# Patient Record
Sex: FEMALE | Race: WHITE | NOT HISPANIC OR LATINO | Employment: FULL TIME | URBAN - METROPOLITAN AREA
[De-identification: names, ages, dates, MRNs, and addresses within clinical notes are randomized per-mention and may not be internally consistent; named-entity substitution may affect disease eponyms.]

---

## 2022-01-13 ENCOUNTER — TRANSFERRED RECORDS (OUTPATIENT)
Dept: HEALTH INFORMATION MANAGEMENT | Facility: CLINIC | Age: 37
End: 2022-01-13

## 2022-01-13 LAB
ALT SERPL-CCNC: 25 IU/L (ref 7–54)
AST SERPL-CCNC: 16 IU/L (ref 8–48)
CREATININE (EXTERNAL): 0.88 MG/DL (ref 0.6–1.3)
GFR ESTIMATED (EXTERNAL): >60 ML/MIN/1.73M2
GFR ESTIMATED (IF AFRICAN AMERICAN) (EXTERNAL): >60 ML/MIN/1.73M2
GLUCOSE (EXTERNAL): 95 MG/DL (ref 65–99)
POTASSIUM (EXTERNAL): 4.1 MMOL/L (ref 3.3–5)

## 2022-04-15 ENCOUNTER — TRANSFERRED RECORDS (OUTPATIENT)
Dept: HEALTH INFORMATION MANAGEMENT | Facility: CLINIC | Age: 37
End: 2022-04-15

## 2022-06-06 ENCOUNTER — VIRTUAL VISIT (OUTPATIENT)
Dept: URGENT CARE | Facility: CLINIC | Age: 37
End: 2022-06-06
Payer: COMMERCIAL

## 2022-06-06 DIAGNOSIS — I77.74 VERTEBRAL ARTERY DISSECTION (H): ICD-10-CM

## 2022-06-06 DIAGNOSIS — U07.1 COVID-19 VIRUS INFECTION: Primary | ICD-10-CM

## 2022-06-06 PROCEDURE — 99203 OFFICE O/P NEW LOW 30 MIN: CPT | Mod: 95

## 2022-06-06 RX ORDER — ASPIRIN 81 MG/1
81 TABLET, CHEWABLE ORAL DAILY
Qty: 90 TABLET | Refills: 0 | COMMUNITY
Start: 2022-06-06 | End: 2022-07-11

## 2022-06-06 RX ORDER — CLOPIDOGREL BISULFATE 75 MG/1
75 TABLET ORAL DAILY
Qty: 90 TABLET | Refills: 3 | COMMUNITY
Start: 2022-06-06 | End: 2022-07-11

## 2022-06-06 NOTE — PROGRESS NOTES
Macrina is a 37 year old who is being evaluated via a billable phone visit.      Tested + today.  Sx started yesterday 6/5  COVID vaccinated and boosted.    HA, body aches, fever and chills.    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  Residual--LEFT sided hypersensitivity and fatigue.    Just moved from [place].    Assessment & Plan     1. COVID-19 infection    - molnupiravir (LAGEVRIO) 200 MG capsule; Take 4 capsules (800 mg) by mouth every 12 hours for 5 days  Dispense: 40 each; Refill: 0    On Plavix and ASA-- will avoid Paxlovid and use molnupiravir with recent COVID infection.    COVID-19 positive patient.  Encounter for consideration of medication intervention. Patient does qualify for a prescription. Full discussion with patient including medication options, risks and benefits. Potential drug interactions reviewed with patient.     Treatment Planned Paxlovid, Rx sent to [place] pharmacy  [facility]   [phone]    [address] [place] [address]    Hours:  Mon-Fri: 8:30a - 5:00p  Sat-Sun: 9:00a - 1:00p    Drive-thru available   Temporary change to home medications:  None     Iris Cao MD  [facility] Urgent Care  [facility]    Subjective   Macrina is a 37 year old who presents for the following health issues     HPI     Tested + today.  Sx started yesterday 6/5  COVID vaccinated and boosted.    HA, body aches, fever and chills.    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  Residual--LEFT sided hypersensitivity and fatigue.    Just moved from [place].      Review of Systems   Constitutional, HEENT, cardiovascular, pulmonary, GI, , musculoskeletal, neuro, skin, endocrine and psych systems are negative, except as otherwise noted.      Objective           Vitals:  No vitals were obtained today due to virtual visit.    Physical Exam   GENERAL: Healthy, alert and no distress  PSYCH:  mentation appears normal and affect normal/bright    Phone call duration # 12 minutes.

## 2022-07-11 ENCOUNTER — OFFICE VISIT (OUTPATIENT)
Dept: FAMILY MEDICINE | Facility: CLINIC | Age: 37
End: 2022-07-11
Payer: COMMERCIAL

## 2022-07-11 VITALS
HEART RATE: 84 BPM | WEIGHT: 114 LBS | OXYGEN SATURATION: 99 % | HEIGHT: 61 IN | DIASTOLIC BLOOD PRESSURE: 80 MMHG | BODY MASS INDEX: 21.52 KG/M2 | SYSTOLIC BLOOD PRESSURE: 110 MMHG

## 2022-07-11 DIAGNOSIS — R93.2 ABNORMAL CT OF LIVER: ICD-10-CM

## 2022-07-11 DIAGNOSIS — I77.74 VERTEBRAL ARTERY DISSECTION (H): ICD-10-CM

## 2022-07-11 DIAGNOSIS — K50.00 CROHN'S DISEASE OF SMALL INTESTINE WITHOUT COMPLICATION (H): ICD-10-CM

## 2022-07-11 DIAGNOSIS — I63.211 CEREBROVASCULAR ACCIDENT (CVA) DUE TO OCCLUSION OF RIGHT VERTEBRAL ARTERY (H): ICD-10-CM

## 2022-07-11 DIAGNOSIS — Z00.00 ROUTINE GENERAL MEDICAL EXAMINATION AT A HEALTH CARE FACILITY: Primary | ICD-10-CM

## 2022-07-11 DIAGNOSIS — Z12.4 CERVICAL CANCER SCREENING: ICD-10-CM

## 2022-07-11 DIAGNOSIS — R20.2 PARESTHESIAS: ICD-10-CM

## 2022-07-11 DIAGNOSIS — Z13.220 SCREENING FOR LIPID DISORDERS: ICD-10-CM

## 2022-07-11 DIAGNOSIS — Z80.3 FAMILY HISTORY OF MALIGNANT NEOPLASM OF BREAST: ICD-10-CM

## 2022-07-11 DIAGNOSIS — J84.10 LUNG GRANULOMA (H): ICD-10-CM

## 2022-07-11 PROBLEM — E55.9 VITAMIN D DEFICIENCY: Status: ACTIVE | Noted: 2019-04-22

## 2022-07-11 PROBLEM — N87.0 CIN I (CERVICAL INTRAEPITHELIAL NEOPLASIA I): Status: ACTIVE | Noted: 2018-01-16

## 2022-07-11 PROBLEM — R92.8 ABNORMAL MAMMOGRAM: Status: ACTIVE | Noted: 2017-12-27

## 2022-07-11 PROBLEM — B02.9 HERPES ZOSTER WITHOUT COMPLICATION: Status: ACTIVE | Noted: 2019-11-13

## 2022-07-11 PROBLEM — I73.00 RAYNAUD'S DISEASE WITHOUT GANGRENE: Status: ACTIVE | Noted: 2019-04-22

## 2022-07-11 PROBLEM — H53.2 DOUBLE VISION: Status: ACTIVE | Noted: 2021-12-30

## 2022-07-11 PROBLEM — N60.19 FIBROCYSTIC BREAST CHANGES: Status: ACTIVE | Noted: 2017-12-27

## 2022-07-11 PROBLEM — R92.30 DENSE BREAST TISSUE ON MAMMOGRAM: Status: ACTIVE | Noted: 2017-12-27

## 2022-07-11 PROBLEM — R87.612 PAPANICOLAOU SMEAR OF CERVIX WITH LOW GRADE SQUAMOUS INTRAEPITHELIAL LESION (LGSIL): Status: ACTIVE | Noted: 2017-11-13

## 2022-07-11 LAB
ALBUMIN SERPL BCG-MCNC: 4.3 G/DL (ref 3.5–5.2)
ALP SERPL-CCNC: 67 U/L (ref 35–104)
ALT SERPL W P-5'-P-CCNC: 18 U/L (ref 10–35)
ANION GAP SERPL CALCULATED.3IONS-SCNC: 7 MMOL/L (ref 7–15)
AST SERPL W P-5'-P-CCNC: 23 U/L (ref 10–35)
BILIRUB SERPL-MCNC: 0.4 MG/DL
BUN SERPL-MCNC: 11.2 MG/DL (ref 6–20)
CALCIUM SERPL-MCNC: 9.6 MG/DL (ref 8.6–10)
CHLORIDE SERPL-SCNC: 104 MMOL/L (ref 98–107)
CHOLEST SERPL-MCNC: 171 MG/DL
CREAT SERPL-MCNC: 0.74 MG/DL (ref 0.51–0.95)
DEPRECATED HCO3 PLAS-SCNC: 29 MMOL/L (ref 22–29)
ERYTHROCYTE [DISTWIDTH] IN BLOOD BY AUTOMATED COUNT: 12.9 % (ref 10–15)
GFR SERPL CREATININE-BSD FRML MDRD: >90 ML/MIN/1.73M2
GLUCOSE SERPL-MCNC: 87 MG/DL (ref 70–99)
HCT VFR BLD AUTO: 40.7 % (ref 35–47)
HDLC SERPL-MCNC: 66 MG/DL
HGB BLD-MCNC: 13.1 G/DL (ref 11.7–15.7)
LDLC SERPL CALC-MCNC: 93 MG/DL
MCH RBC QN AUTO: 29.6 PG (ref 26.5–33)
MCHC RBC AUTO-ENTMCNC: 32.2 G/DL (ref 31.5–36.5)
MCV RBC AUTO: 92 FL (ref 78–100)
NONHDLC SERPL-MCNC: 105 MG/DL
PLATELET # BLD AUTO: 301 10E3/UL (ref 150–450)
POTASSIUM SERPL-SCNC: 4.1 MMOL/L (ref 3.4–5.3)
PROT SERPL-MCNC: 7 G/DL (ref 6.4–8.3)
RBC # BLD AUTO: 4.43 10E6/UL (ref 3.8–5.2)
SODIUM SERPL-SCNC: 140 MMOL/L (ref 136–145)
TRIGL SERPL-MCNC: 60 MG/DL
WBC # BLD AUTO: 6.5 10E3/UL (ref 4–11)

## 2022-07-11 PROCEDURE — 36415 COLL VENOUS BLD VENIPUNCTURE: CPT | Performed by: FAMILY MEDICINE

## 2022-07-11 PROCEDURE — 99385 PREV VISIT NEW AGE 18-39: CPT | Performed by: FAMILY MEDICINE

## 2022-07-11 PROCEDURE — G0145 SCR C/V CYTO,THINLAYER,RESCR: HCPCS | Performed by: FAMILY MEDICINE

## 2022-07-11 PROCEDURE — 87624 HPV HI-RISK TYP POOLED RSLT: CPT | Performed by: FAMILY MEDICINE

## 2022-07-11 PROCEDURE — 85027 COMPLETE CBC AUTOMATED: CPT | Performed by: FAMILY MEDICINE

## 2022-07-11 PROCEDURE — 99214 OFFICE O/P EST MOD 30 MIN: CPT | Mod: 25 | Performed by: FAMILY MEDICINE

## 2022-07-11 PROCEDURE — 80053 COMPREHEN METABOLIC PANEL: CPT | Performed by: FAMILY MEDICINE

## 2022-07-11 PROCEDURE — 80061 LIPID PANEL: CPT | Performed by: FAMILY MEDICINE

## 2022-07-11 RX ORDER — PRENATAL VIT/IRON FUM/FOLIC AC 27MG-0.8MG
1 TABLET ORAL DAILY
COMMUNITY
End: 2023-02-28

## 2022-07-11 RX ORDER — CLOPIDOGREL BISULFATE 75 MG/1
75 TABLET ORAL DAILY
Qty: 90 TABLET | Refills: 1 | Status: SHIPPED | OUTPATIENT
Start: 2022-07-11 | End: 2023-01-17

## 2022-07-11 ASSESSMENT — ENCOUNTER SYMPTOMS
FEVER: 0
SORE THROAT: 0
FREQUENCY: 0
CONSTIPATION: 0
DYSURIA: 0
COUGH: 0
EYE PAIN: 0
WEAKNESS: 0
MYALGIAS: 0
PALPITATIONS: 0
ARTHRALGIAS: 0
ABDOMINAL PAIN: 0
HEARTBURN: 0
HEMATURIA: 0
NAUSEA: 0
HEADACHES: 0
NERVOUS/ANXIOUS: 0
SHORTNESS OF BREATH: 0
HEMATOCHEZIA: 0
PARESTHESIAS: 1
DIARRHEA: 0
DIZZINESS: 0
BREAST MASS: 0
CHILLS: 0
JOINT SWELLING: 0

## 2022-07-11 NOTE — PROGRESS NOTES
"   SUBJECTIVE:   CC: Macrina Gonzales is an 37 year old woman who presents for preventive health visit.     Patient has been advised of split billing requirements and indicates understanding: Yes     Healthy Habits:     Getting at least 3 servings of Calcium per day:  Yes    Bi-annual eye exam:  Yes    Dental care twice a year:  Yes    Sleep apnea or symptoms of sleep apnea:  None    Diet:  Regular (no restrictions)    Frequency of exercise:  2-3 days/week    Duration of exercise:  15-30 minutes    Taking medications regularly:  Yes    Medication side effects:  Not applicable    PHQ-2 Total Score: 0    Additional concerns today:  Yes    Problems to Add:  1.  Has a history of CVA related to a vertebral artery dissection.  Moved to [place] from [place] earlier this year.  At her last visit on 4/18/22 with stroke neurology in [place]:  Recommendations:   - Continue Plavix 75 mg daily. Recommend 1 year of treatment with single antiplatelet agent.   - No need for further neuroimaging unless she has headache, neck pain, new focal neurological symptoms.   - Discuss CT Chest/Abdomen/Pelvis results with her PCP. I defer need for further consultation (pulmonology, rheumatology?) as well as need for further testing to her primary team. She is moving to [place] in 2 weeks. She will likely need all this done with new team in [place] area.   - Establish care with stroke neurology in [place]. She can consider [facility] or [facility], both have excellent stroke programs. This will ultimately depend on her insurance. I advised her to establish care with PCP and obtain a referral from them.   Fareed Vallejo MD  Vascular Neurology  Needs to establish with a stroke neurologist here in [place].  Has some questions about how long she needs to continue dual antiplatelet therapy.  2.  Has some ongoing left-sided hypersensitivity.  During the day takes \"CBG\", and at night eats part of a THC gummy.  Tingling discomfort, " pins and needles sensation, worse in left leg/calf area.  Wondering what to do since THC is not legal in [place].  3. Off medicine for Crohn's since 2021.  No flares recently.  Diagnosed at age 18, severe disease requiring iliectomy in the past.  Has not had a flare for approximately 10 years.  Needs to establish with gastroenterology in the area.      Today's PHQ-2 Score:   PHQ-2 (  [employer]) 2022   Q1: Little interest or pleasure in doing things 0   Q2: Feeling down, depressed or hopeless 0   PHQ-2 Score 0   Q1: Little interest or pleasure in doing things Not at all   Q2: Feeling down, depressed or hopeless Not at all   PHQ-2 Score 0       Abuse: Current or Past (Physical, Sexual or Emotional) - No  Do you feel safe in your environment? Yes    Have you ever done Advance Care Planning? (For example, a Health Directive, POLST, or a discussion with a medical provider or your loved ones about your wishes): No, advance care planning information given to patient to review.  Patient declined advance care planning discussion at this time.    Social History     Tobacco Use     Smoking status: Former Smoker     Packs/day: 1.00     Years: 7.00     Pack years: 7.00     Types: Cigarettes     Start date: 2003     Quit date: 2010     Years since quittin.5     Smokeless tobacco: Never Used     Tobacco comment: Smoked when I was younger, no longer smoke   Substance Use Topics     Alcohol use: Not Currently     If you drink alcohol do you typically have >3 drinks per day or >7 drinks per week? No    Alcohol Use 2022   Prescreen: >3 drinks/day or >7 drinks/week? Not Applicable   Prescreen: >3 drinks/day or >7 drinks/week? -   No flowsheet data found.    Reviewed orders with patient.  Reviewed health maintenance and updated orders accordingly - Yes    BP Readings from Last 3 Encounters:   22 110/80    Wt Readings from Last 3 Encounters:   22 51.7 kg (114 lb)                  There is no  problem list on file for this patient.    Past Surgical History:   Procedure Laterality Date     COLONOSCOPY  Need one this summer    Crohn s disease     GI SURGERY  When I was 21    Iliectomy     ORTHOPEDIC SURGERY  When I was 18    Plate in right arm       Social History     Tobacco Use     Smoking status: Former Smoker     Packs/day: 1.00     Years: 7.00     Pack years: 7.00     Types: Cigarettes     Start date: 2003     Quit date: 2010     Years since quittin.5     Smokeless tobacco: Never Used     Tobacco comment: Smoked when I was younger, no longer smoke   Substance Use Topics     Alcohol use: Not Currently     Family History   Problem Relation Age of Onset     Anxiety Disorder Mother      Obesity Mother      Diabetes Father      Hypertension Father      Obesity Father      Anxiety Disorder Brother          Current Outpatient Medications   Medication Sig Dispense Refill     aspirin (ASA) 81 MG chewable tablet Take 1 tablet (81 mg) by mouth daily 90 tablet 0     clopidogrel (PLAVIX) 75 MG tablet Take 1 tablet (75 mg) by mouth daily 90 tablet 3     Prenatal Vit-Fe Fumarate-FA (PRENATAL MULTIVITAMIN W/IRON) 27-0.8 MG tablet Take 1 tablet by mouth daily       vitamin C (ASCORBIC ACID) 100 MG tablet Take 100 mg by mouth       No Known Allergies    Breast Cancer Screening:    FHS-7:   Breast CA Risk Assessment (FHS-7) 2022   Did any of your first-degree relatives have breast or ovarian cancer? No   Did any of your relatives have bilateral breast cancer? Unknown   Did any man in your family have breast cancer? No   Did any woman in your family have breast and ovarian cancer? No   Did any woman in your family have breast cancer before age 50 y? Yes   Do you have 2 or more relatives with breast and/or ovarian cancer? Yes   Do you have 2 or more relatives with breast and/or bowel cancer? No       History of abnormal Pap smear: YES - updated in Problem List and Health Maintenance accordingly    "  Reviewed and updated as needed this visit by clinical staff   Tobacco  Allergies  Meds                Reviewed and updated as needed this visit by Provider   Tobacco  Allergies  Meds  Problems  Med Hx  Surg Hx  Fam Hx  Soc   Hx           Review of Systems   Constitutional: Negative for chills and fever.   HENT: Negative for congestion, ear pain, hearing loss and sore throat.    Eyes: Negative for pain and visual disturbance.   Respiratory: Negative for cough and shortness of breath.    Cardiovascular: Negative for chest pain, palpitations and peripheral edema.   Gastrointestinal: Negative for abdominal pain, constipation, diarrhea, heartburn, hematochezia and nausea.   Breasts:  Negative for tenderness, breast mass and discharge.   Genitourinary: Negative for dysuria, frequency, genital sores, hematuria, pelvic pain, urgency, vaginal bleeding and vaginal discharge.   Musculoskeletal: Negative for arthralgias, joint swelling and myalgias.   Skin: Negative for rash.   Neurological: Positive for paresthesias. Negative for dizziness, weakness and headaches.   Psychiatric/Behavioral: Negative for mood changes. The patient is not nervous/anxious.         OBJECTIVE:   /80 (BP Location: Right arm, Patient Position: Sitting, Cuff Size: Adult Regular)   Pulse 84   Ht 1.556 m (5' 1.25\")   Wt 51.7 kg (114 lb)   LMP 06/25/2022 (Exact Date)   SpO2 99%   Breastfeeding No   BMI 21.36 kg/m    Physical Exam  GENERAL: healthy, alert and no distress  EYES: Eyes grossly normal to inspection, PERRL and conjunctivae and sclerae normal  HENT: ear canals and TM's normal, nose and mouth without ulcers or lesions  NECK: no adenopathy, no asymmetry, masses, or scars and thyroid normal to palpation  RESP: lungs clear to auscultation - no rales, rhonchi or wheezes  BREAST: normal without masses, tenderness or nipple discharge and no palpable axillary masses or adenopathy  CV: regular rate and rhythm, normal S1 S2, no S3 " or S4, no murmur, click or rub, no peripheral edema and peripheral pulses strong  ABDOMEN: soft, nontender, no hepatosplenomegaly, no masses and bowel sounds normal   (female): normal female external genitalia, normal urethral meatus, vaginal mucosa pink, moist, well rugated, and normal cervix/adnexa/uterus without masses or discharge  MS: no gross musculoskeletal defects noted, no edema  SKIN: no suspicious lesions or rashes  NEURO: Normal strength and tone, mentation intact and speech normal  PSYCH: mentation appears normal, affect normal/bright    Diagnostic Test Results:  Labs reviewed in Epic  Pending at time of note    ASSESSMENT/PLAN:   1. Routine general medical examination at a health care facility  Routine history and physical, updated in EMR.  Labs updated as below.  Immunizations are up-to-date.  Pap smear updated today.  Plan repeat physical in 1 year.  - Lipid panel reflex to direct LDL Fasting  - Comprehensive metabolic panel (BMP + Alb, Alk Phos, ALT, AST, Total. Bili, TP)  - CBC with platelets    2. Cerebrovascular accident (CVA) due to occlusion of right vertebral artery (H)  3. Vertebral artery dissection (H)  Patient has some residual left-sided paresthesias for which she would like to use medicinal marijuana.  See below for further discussion.  She needs to establish with a stroke neurologist in the area and referral was placed today.  Per her recommendation from her previous stroke neurologist, she only needed single antiplatelet therapy and Plavix was recommended for at least 1 year.  She will stop aspirin and continue Plavix.  - Adult Neurology  Referral; Future  - clopidogrel (PLAVIX) 75 MG tablet; Take 1 tablet (75 mg) by mouth daily  Dispense: 90 tablet; Refill: 1  - Comprehensive metabolic panel (BMP + Alb, Alk Phos, ALT, AST, Total. Bili, TP)  - CBC with platelets    4. Paresthesias  Discussed with patient that the most efficient way to get certified for medical marijuana in  "[place] is probably to use an online resource.  She will look into this.    5. Crohn's disease of small intestine without complication (H)  In remission for about 10 years per patient.  Gastroenterology referral placed to establish care here in [place].  - Adult GI  Referral - Consult Only; Future  - Comprehensive metabolic panel (BMP + Alb, Alk Phos, ALT, AST, Total. Bili, TP)  - CBC with platelets    6. Abnormal CT of liver  Some densities were found in the liver incidentally on screening of her vasculature after her stroke.  Further evaluation with liver ultrasound was recommended.  This was ordered today.  - US Abdomen Limited; Future    7. Family history of malignant neoplasm of breast  Patient has some onset of had genetic testing of unknown significance.  She thinks her mother has had some genetic testing in terms of breast cancer as well.  She will look into this history a bit more and will defer seeing a genetic counselor herself until next year.  We can have this discussion again then.    8. Lung granuloma (H)  Patient has never been tested for any other autoimmune disorders other than Crohn's.  She has had no history of lung issues, no joint pains, no abnormal skin rashes.  We will hold off on further work-up of granulomas for now.    9. Cervical cancer screening  Routine screening Pap smear updated today.  - Pap Screen with HPV - recommended age 30 - 65 years    10. Screening for lipid disorders  Lipid profile updated today.  - Lipid panel reflex to direct LDL Fasting      Patient has been advised of split billing requirements and indicates understanding: Yes    COUNSELING:  Reviewed preventive health counseling, as reflected in patient instructions  Special attention given to:        Regular exercise       Healthy diet/nutrition       Family planning    Estimated body mass index is 21.36 kg/m  as calculated from the following:    Height as of this encounter: 1.556 m (5' 1.25\").    Weight as " of this encounter: 51.7 kg (114 lb).        She reports that she quit smoking about 12 years ago. Her smoking use included cigarettes. She started smoking about 19 years ago. She has a 7.00 pack-year smoking history. She has never used smokeless tobacco.      Counseling Resources:  ATP IV Guidelines  Pooled Cohorts Equation Calculator  Breast Cancer Risk Calculator  BRCA-Related Cancer Risk Assessment: FHS-7 Tool  FRAX Risk Assessment  ICSI Preventive Guidelines  Dietary Guidelines for Americans, 2010  USDA's MyPlate  ASA Prophylaxis  Lung CA Screening    Alea Jerry MD  [facility]

## 2022-07-15 LAB
BKR LAB AP GYN ADEQUACY: NORMAL
BKR LAB AP GYN INTERPRETATION: NORMAL
BKR LAB AP HPV REFLEX: NORMAL
BKR LAB AP LMP: NORMAL
BKR LAB AP PREVIOUS ABNL DX: NORMAL
BKR LAB AP PREVIOUS ABNORMAL: NORMAL
PATH REPORT.COMMENTS IMP SPEC: NORMAL
PATH REPORT.COMMENTS IMP SPEC: NORMAL
PATH REPORT.RELEVANT HX SPEC: NORMAL

## 2022-07-18 LAB
HUMAN PAPILLOMA VIRUS 16 DNA: NEGATIVE
HUMAN PAPILLOMA VIRUS 18 DNA: NEGATIVE
HUMAN PAPILLOMA VIRUS FINAL DIAGNOSIS: NORMAL
HUMAN PAPILLOMA VIRUS OTHER HR: NEGATIVE

## 2022-07-20 NOTE — TELEPHONE ENCOUNTER
Action 7/20/22 MV 1.11pm   Action Taken Called [facility] to obtain fax # for imaging ' no answer so LVM    8/2/22 MV 4.44pm  Fax # for [facility] is [phone]  Tracking # [id number]    8/5/22 Mv 12.39pm  Imaging disks (multiple) received and sent to  for processing         RECORDS RECEIVED FROM: internal   REASON FOR VISIT: CVA   Date of Appt: 11/8/22   NOTES (FOR ALL VISITS) STATUS DETAILS   [facility]   OFFICE NOTE from other specialist Care Everywhere Dr Lj Aranda @ [facility] OR:  1/25/22    Dr Fareed Vallejo @ [facility] [place]:  1/24/22   DISCHARGE SUMMARY from hospital Care Everywhere [facility] OR:  12/24/21-12/30/21   DISCHARGE REPORT from the ER Care Everywhere [facility] OR:  1/13/22 12/24/21   MEDICATION LIST Internal    IMAGING  (FOR ALL VISITS)     MRI (HEAD, NECK, SPINE) Received [facility] OR:  MRI Brain 1/13/22  MRI BRain 12/24/21   CT (HEAD, NECK, SPINE) Received [facility] OR:  CTA Head Neck 4/15/22  CTA Head Neck 1/13/22  CT Head 1/13/22  CTA Head Neck 12/24/21  CT Head 12/24/21

## 2022-07-20 NOTE — TELEPHONE ENCOUNTER
REFERRAL INFORMATION:    Referring Provider:  Dr. Alea Jerry     Referring Clinic:  [facility]     Reason for Visit/Diagnosis: Crohn's of Small Intestine      FUTURE VISIT INFORMATION:    Appointment Date: 10/20/2022    Appointment Time: 11:20 AM      NOTES STATUS DETAILS   OFFICE NOTE from Referring Provider Internal 7/11/2022 Office visit with Dr. Jerry      OFFICE NOTE from Other Specialist Care Everywhere 4/22/19 Office visit with Dr. Brain Narayanan ([facility])    4/23/18 Office visit with Nay Saldana PA-C ([facility])      HOSPITAL DISCHARGE SUMMARY/  ED VISITS N/A    OPERATIVE REPORT N/A    MEDICATION LIST Internal         ENDOSCOPY  N/A    COLONOSCOPY N/A    ERCP N/A    EUS N/A    STOOL TESTING N/A    PERTINENT LABS Care Everywhere    PATHOLOGY REPORTS (RELATED) N/A    IMAGING (CT, MRI, EGD, MRCP, Small Bowel Follow Through/SBT, MR/CT Enterography) N/A

## 2022-07-25 ENCOUNTER — HOSPITAL ENCOUNTER (OUTPATIENT)
Dept: ULTRASOUND IMAGING | Facility: CLINIC | Age: 37
Discharge: HOME | End: 2022-07-25
Attending: FAMILY MEDICINE | Admitting: FAMILY MEDICINE
Payer: COMMERCIAL

## 2022-07-25 DIAGNOSIS — R93.2 ABNORMAL CT OF LIVER: ICD-10-CM

## 2022-07-25 PROCEDURE — 76705 ECHO EXAM OF ABDOMEN: CPT

## 2022-09-20 ASSESSMENT — ENCOUNTER SYMPTOMS
NERVOUS/ANXIOUS: 1
WEAKNESS: 0
SEIZURES: 0
TINGLING: 1
HEADACHES: 1
TREMORS: 0
SPEECH CHANGE: 0
DECREASED CONCENTRATION: 0
PARALYSIS: 0
DISTURBANCES IN COORDINATION: 0
DIZZINESS: 0
LOSS OF CONSCIOUSNESS: 0
PANIC: 0
NUMBNESS: 1
INSOMNIA: 0
DEPRESSION: 1
MEMORY LOSS: 0

## 2022-09-22 ENCOUNTER — LAB (OUTPATIENT)
Dept: LAB | Facility: CLINIC | Age: 37
End: 2022-09-22
Payer: COMMERCIAL

## 2022-09-22 ENCOUNTER — OFFICE VISIT (OUTPATIENT)
Dept: GASTROENTEROLOGY | Facility: CLINIC | Age: 37
End: 2022-09-22
Attending: FAMILY MEDICINE

## 2022-09-22 VITALS
DIASTOLIC BLOOD PRESSURE: 80 MMHG | BODY MASS INDEX: 22.01 KG/M2 | SYSTOLIC BLOOD PRESSURE: 114 MMHG | HEIGHT: 61 IN | WEIGHT: 116.6 LBS | OXYGEN SATURATION: 99 % | HEART RATE: 84 BPM

## 2022-09-22 DIAGNOSIS — K50.00 CROHN'S DISEASE OF SMALL INTESTINE WITHOUT COMPLICATION (H): ICD-10-CM

## 2022-09-22 LAB
ALBUMIN SERPL-MCNC: 4 G/DL (ref 3.5–5)
ALP SERPL-CCNC: 67 U/L (ref 45–120)
ALT SERPL W P-5'-P-CCNC: 14 U/L (ref 0–45)
ANION GAP SERPL CALCULATED.3IONS-SCNC: 9 MMOL/L (ref 5–18)
AST SERPL W P-5'-P-CCNC: 18 U/L (ref 0–40)
BASOPHILS # BLD AUTO: 0 10E3/UL (ref 0–0.2)
BASOPHILS NFR BLD AUTO: 0 %
BILIRUB DIRECT SERPL-MCNC: 0.2 MG/DL
BILIRUB SERPL-MCNC: 0.5 MG/DL (ref 0–1)
BUN SERPL-MCNC: 14 MG/DL (ref 8–22)
C REACTIVE PROTEIN LHE: <0.1 MG/DL (ref 0–?)
CALCIUM SERPL-MCNC: 9.4 MG/DL (ref 8.5–10.5)
CHLORIDE BLD-SCNC: 103 MMOL/L (ref 98–107)
CO2 SERPL-SCNC: 25 MMOL/L (ref 22–31)
CREAT SERPL-MCNC: 0.79 MG/DL (ref 0.6–1.1)
EOSINOPHIL # BLD AUTO: 0 10E3/UL (ref 0–0.7)
EOSINOPHIL NFR BLD AUTO: 0 %
ERYTHROCYTE [DISTWIDTH] IN BLOOD BY AUTOMATED COUNT: 12.8 % (ref 10–15)
ERYTHROCYTE [SEDIMENTATION RATE] IN BLOOD BY WESTERGREN METHOD: 8 MM/HR (ref 0–20)
FERRITIN SERPL-MCNC: 62 NG/ML (ref 6–175)
FOLATE SERPL-MCNC: 14.2 NG/ML (ref 4.6–34.8)
GFR SERPL CREATININE-BSD FRML MDRD: >90 ML/MIN/1.73M2
GLUCOSE BLD-MCNC: 88 MG/DL (ref 70–125)
HCT VFR BLD AUTO: 42.4 % (ref 35–47)
HGB BLD-MCNC: 13.8 G/DL (ref 11.7–15.7)
IMM GRANULOCYTES # BLD: 0 10E3/UL
IMM GRANULOCYTES NFR BLD: 0 %
IRON BINDING CAPACITY (ROCHE): 236 UG/DL (ref 240–430)
IRON SATN MFR SERPL: 31 % (ref 15–46)
IRON SERPL-MCNC: 72 UG/DL (ref 37–145)
LYMPHOCYTES # BLD AUTO: 2.5 10E3/UL (ref 0.8–5.3)
LYMPHOCYTES NFR BLD AUTO: 31 %
MCH RBC QN AUTO: 29.7 PG (ref 26.5–33)
MCHC RBC AUTO-ENTMCNC: 32.5 G/DL (ref 31.5–36.5)
MCV RBC AUTO: 91 FL (ref 78–100)
MONOCYTES # BLD AUTO: 0.5 10E3/UL (ref 0–1.3)
MONOCYTES NFR BLD AUTO: 6 %
NEUTROPHILS # BLD AUTO: 4.9 10E3/UL (ref 1.6–8.3)
NEUTROPHILS NFR BLD AUTO: 63 %
NRBC # BLD AUTO: 0 10E3/UL
NRBC BLD AUTO-RTO: 0 /100
PLATELET # BLD AUTO: 281 10E3/UL (ref 150–450)
POTASSIUM BLD-SCNC: 4.1 MMOL/L (ref 3.5–5)
PROT SERPL-MCNC: 7.6 G/DL (ref 6–8)
RBC # BLD AUTO: 4.64 10E6/UL (ref 3.8–5.2)
SODIUM SERPL-SCNC: 137 MMOL/L (ref 136–145)
TSH SERPL DL<=0.005 MIU/L-ACNC: 0.97 UIU/ML (ref 0.3–5)
VIT B12 SERPL-MCNC: 369 PG/ML (ref 232–1245)
WBC # BLD AUTO: 7.9 10E3/UL (ref 4–11)

## 2022-09-22 PROCEDURE — 83993 ASSAY FOR CALPROTECTIN FECAL: CPT

## 2022-09-22 PROCEDURE — 82310 ASSAY OF CALCIUM: CPT

## 2022-09-22 PROCEDURE — 86803 HEPATITIS C AB TEST: CPT

## 2022-09-22 PROCEDURE — 82248 BILIRUBIN DIRECT: CPT

## 2022-09-22 PROCEDURE — 85652 RBC SED RATE AUTOMATED: CPT

## 2022-09-22 PROCEDURE — 82607 VITAMIN B-12: CPT

## 2022-09-22 PROCEDURE — 84443 ASSAY THYROID STIM HORMONE: CPT

## 2022-09-22 PROCEDURE — 86481 TB AG RESPONSE T-CELL SUSP: CPT

## 2022-09-22 PROCEDURE — 86704 HEP B CORE ANTIBODY TOTAL: CPT

## 2022-09-22 PROCEDURE — 87340 HEPATITIS B SURFACE AG IA: CPT

## 2022-09-22 PROCEDURE — 82746 ASSAY OF FOLIC ACID SERUM: CPT

## 2022-09-22 PROCEDURE — 82784 ASSAY IGA/IGD/IGG/IGM EACH: CPT

## 2022-09-22 PROCEDURE — 99205 OFFICE O/P NEW HI 60 MIN: CPT | Mod: GC | Performed by: INTERNAL MEDICINE

## 2022-09-22 PROCEDURE — 86706 HEP B SURFACE ANTIBODY: CPT

## 2022-09-22 PROCEDURE — 36415 COLL VENOUS BLD VENIPUNCTURE: CPT

## 2022-09-22 PROCEDURE — 85014 HEMATOCRIT: CPT

## 2022-09-22 PROCEDURE — 86364 TISS TRNSGLTMNASE EA IG CLAS: CPT

## 2022-09-22 PROCEDURE — 86140 C-REACTIVE PROTEIN: CPT

## 2022-09-22 PROCEDURE — 82657 ENZYME CELL ACTIVITY: CPT

## 2022-09-22 PROCEDURE — 83550 IRON BINDING TEST: CPT

## 2022-09-22 PROCEDURE — 82728 ASSAY OF FERRITIN: CPT

## 2022-09-22 ASSESSMENT — ENCOUNTER SYMPTOMS
TACHYCARDIA: 0
SMELL DISTURBANCE: 0
BOWEL INCONTINENCE: 0
EYE WATERING: 0
NECK PAIN: 0
CLAUDICATION: 0
TREMORS: 0
DEPRESSION: 1
DECREASED CONCENTRATION: 0
COUGH: 0
PARALYSIS: 0
SYNCOPE: 0
HOARSE VOICE: 0
HEADACHES: 1
BREAST MASS: 0
SPEECH CHANGE: 0
NUMBNESS: 1
TROUBLE SWALLOWING: 0
BRUISES/BLEEDS EASILY: 0
HEMOPTYSIS: 0
ORTHOPNEA: 0
TINGLING: 1
PANIC: 0
FEVER: 0
EYE IRRITATION: 0
NAUSEA: 0
HYPERTENSION: 0
MUSCLE WEAKNESS: 0
ALTERED TEMPERATURE REGULATION: 0
STIFFNESS: 0
DIZZINESS: 0
SNORES LOUDLY: 0
POLYPHAGIA: 0
NIGHT SWEATS: 0
RECTAL PAIN: 0
SINUS PAIN: 0
VOMITING: 0
HEARTBURN: 0
WEIGHT GAIN: 0
LOSS OF CONSCIOUSNESS: 0
FLANK PAIN: 0
PALPITATIONS: 0
HEMATURIA: 0
ABDOMINAL PAIN: 0
POOR WOUND HEALING: 0
POLYDIPSIA: 0
HALLUCINATIONS: 0
NAIL CHANGES: 0
BLOOD IN STOOL: 0
DISTURBANCES IN COORDINATION: 0
SWOLLEN GLANDS: 0
HYPOTENSION: 0
INSOMNIA: 0
DYSURIA: 0
DOUBLE VISION: 0
DECREASED LIBIDO: 0
TASTE DISTURBANCE: 0
SKIN CHANGES: 0
RECTAL BLEEDING: 0
EYE REDNESS: 0
ARTHRALGIAS: 0
JAUNDICE: 0
EYE PAIN: 0
WEAKNESS: 0
POSTURAL DYSPNEA: 0
CONSTIPATION: 0
COUGH DISTURBING SLEEP: 0
DIARRHEA: 0
RESPIRATORY PAIN: 0
HOT FLASHES: 0
FATIGUE: 0
NERVOUS/ANXIOUS: 1
SPUTUM PRODUCTION: 0
LEG PAIN: 0
NECK MASS: 0
EXERCISE INTOLERANCE: 0
MYALGIAS: 0
WEIGHT LOSS: 0
BLOATING: 0
MUSCLE CRAMPS: 0
MEMORY LOSS: 0
DECREASED APPETITE: 0
CHILLS: 0
LEG SWELLING: 0
DIFFICULTY URINATING: 0
SINUS CONGESTION: 0
SLEEP DISTURBANCES DUE TO BREATHING: 0
JOINT SWELLING: 0
BACK PAIN: 0
LIGHT-HEADEDNESS: 0
WHEEZING: 0
INCREASED ENERGY: 0
BREAST PAIN: 0
SEIZURES: 0
SHORTNESS OF BREATH: 0
DYSPNEA ON EXERTION: 0
SORE THROAT: 0

## 2022-09-22 ASSESSMENT — PAIN SCALES - GENERAL: PAINLEVEL: NO PAIN (0)

## 2022-09-22 NOTE — PROGRESS NOTES
Gastroenterology Clinic        HPI:       Macrina Gonzales is a 37 year old female who presents to hospitals care after moving from [place]    She has a history of Crohn's disease, diagnosed in 2004 at [place] after having severe abdominal pain. She was on 6-MP, prednisone, and entocort. A few years later she had a flare and had a small bowel resection done. After another flare, she was switched to remicade which she stayed on for ~10 years. During that time her Crohn's was mostly under control. Due to insurance issues and difficulty receiving prior authorization every time she needed remicade, she switched to Humira in 8/2016, and her Crohn's remained well controlled. She suffered a CVA in 12/2021 and afterwards she stopped taking her Humira. This was a discussion already in place with her GI doctor in [place] to discontinue the Humira given her remission and see how she would do, with plan to do colonoscopy in the summer of 2022, however she moved to [place]    She says her typical BM is 2-3 stools per day, 4 on the [place] stool chart. Says recently she has been having slightly more stools, maybe 3-4 per day, tends to be 2-4 on [place] stool chart. Also endorses occasional tenesmus. Says she has had occassional blood in the stool, red, small amount, has happened twice since coming off Humira. She denies any extra-intestinal symptoms.     Her last colonoscopy on record was in 8/2017, confirmed disease remission with negative biopsy results. She says she had another colonoscopy in 2019 at [place] that also showed disease remission, although no records of this currently      Problem, Medication and Allergy Lists      Patient Active Problem List    Diagnosis Date Noted     Abnormal CT of liver 07/11/2022     Priority: Medium     Vertebral artery dissection (H) 07/11/2022     Priority: Medium     Family history of malignant neoplasm of breast 07/11/2022     Priority: Medium     Lung granuloma (H) 07/11/2022      Priority: Medium     Paresthesias 07/11/2022     Priority: Medium     left side, primarily leg       Double vision 12/30/2021     Priority: Medium     Cerebrovascular accident (CVA) due to occlusion of right vertebral artery (H) 12/24/2021     Priority: Medium     Formatting of this note is different from the original.  12/28/2021 discharge -clopidogrel 75mg daily. ASA switched to 81mg QD on discharge. Per neuro will need to continue DAPT for 3-6 months. Will need stroke clinic follow-up in ~6 weeks and repeat imaging in three months.    Neurology 1/22 -Recommendations:  1) Further neurodiagnostic work up:   - Repeat CTA head and neck in April 2022.   - Obtain CTA Chest, abdomen and pelvis to evaluate for FMD related changes.      2) Anti platelet therapy or Anti coagulation:   - Continue dual antiplatelet therapy for now- Aspirin 81 + Plavix 75 mg daily.   - Based on the results of follow up CTA, Plavix can be discontinued. Note that she is not breastfeeding since her stroke.     Last Assessment & Plan:   Formatting of this note might be different from the original.  Discussed her ongoing improvement from her stroke in December. Physical improvement is more rapid than cognitive improvement. My recommendation for her is to stay off completely off of work until April 1. She can then return with limitations of 20 hours/week for 4 weeks. As of May 1 you can return to full work. This may need to be modified in the future depending on her recovery.       Herpes zoster without complication 11/13/2019     Priority: Medium     Last Assessment & Plan:   Formatting of this note might be different from the original.  Discussed diagnosis of shingles, more likely with her being on Humira.  She will contact her GI doctor to let him know if her diagnosis.  She is not due for another Humira injection for 2 weeks  Patient is started on valacyclovir 1 g 3 times daily x7 days, discussed symptomatic treatment calling for worsening  "pain, contagiousness       Raynaud's disease without gangrene 04/22/2019     Priority: Medium     Last Assessment & Plan:   Formatting of this note might be different from the original.  Reviewed treatment options, pharmaceutical and nonpharmaceutical.  We will proceed with trial of amlodipine at 2.5 mg daily discussed precautions about hypotension given her baseline blood pressure.  She will let us know how this is doing over the next 3-4 weeks       Vitamin D deficiency 04/22/2019     Priority: Medium     Last Assessment & Plan:   Formatting of this note might be different from the original.  Prescribe 50,000 units weekly, needs blood test may need to change to monthly       CAMELIA I (cervical intraepithelial neoplasia I) 01/16/2018     Priority: Medium     10/30/17 LSIL Pap  1/8/18 Colposcopy: Bx CIN1 ECC: \"minute fragments of mildly atypical endocervical epithelium...most likely representative of reactive/degenerative change\"Plan: Cotesting 1 year  06/20/22 NIL Pap, Neg HR HPV   07/11/22 NIL Pap, Neg HR HPV Plan cotest in 3 years.        Abnormal mammogram 12/27/2017     Priority: Medium     Dense breast tissue on mammogram 12/27/2017     Priority: Medium     Fibrocystic breast changes 12/27/2017     Priority: Medium     Papanicolaou smear of cervix with low grade squamous intraepithelial lesion (LGSIL) 11/13/2017     Priority: Medium     10/30/17, HPV other high risk pos, 16/18neg. Referred for colpo w/ S. CHRISTIAN Guzman.       Crohn's disease of small intestine (H) 11/11/2001     Priority: Medium         Current Outpatient Medications   Medication Sig Dispense Refill     clopidogrel (PLAVIX) 75 MG tablet Take 1 tablet (75 mg) by mouth daily 90 tablet 1     Omega-3 Fatty Acids (FISH OIL PO)        vitamin C (ASCORBIC ACID) 100 MG tablet Take 100 mg by mouth       VITAMIN D, CHOLECALCIFEROL, PO        Prenatal Vit-Fe Fumarate-FA (PRENATAL MULTIVITAMIN W/IRON) 27-0.8 MG tablet Take 1 tablet by mouth daily (Patient not " taking: Reported on 9/22/2022)         No Known Allergies            Review of Systems:     Patient submitted ROS below  Review of Systems     Constitutional:  Negative for fever, chills, weight loss, weight gain, fatigue, decreased appetite, night sweats, recent stressors, height gain, height loss, post-operative complications, incisional pain, hallucinations, increased energy, hyperactivity and confused.   HENT:  Negative for ear pain, hearing loss, tinnitus, nosebleeds, trouble swallowing, hoarse voice, mouth sores, sore throat, ear discharge, tooth pain, gum tenderness, taste disturbance, smell disturbance, hearing aid, bleeding gums, dry mouth, sinus pain, sinus congestion and neck mass.    Eyes:  Negative for double vision, pain, redness, eye pain, decreased vision, eye watering, eye bulging, eye dryness, flashing lights, spots, floaters, strabismus, tunnel vision, jaundice and eye irritation.   Respiratory:   Negative for cough, hemoptysis, sputum production, shortness of breath, wheezing, sleep disturbances due to breathing, snores loudly, respiratory pain, dyspnea on exertion, cough disturbing sleep and postural dyspnea.    Cardiovascular:  Negative for chest pain, dyspnea on exertion, palpitations, orthopnea, claudication, leg swelling, fingers/toes turn blue, hypertension, hypotension, syncope, history of heart murmur, chest pain on exertion, chest pain at rest, pacemaker, few scattered varicosities, leg pain, sleep disturbances due to breathing, tachycardia, light-headedness, exercise intolerance and edema.   Gastrointestinal:  Negative for heartburn, nausea, vomiting, abdominal pain, diarrhea, constipation, blood in stool, melena, rectal pain, bloating, hemorrhoids, bowel incontinence, jaundice, rectal bleeding, coffee ground emesis and change in stool.   Genitourinary:  Negative for bladder incontinence, dysuria, urgency, hematuria, flank pain, vaginal discharge, difficulty urinating, genital sores,  "dyspareunia, decreased libido, nocturia, voiding less frequently, arousal difficulty, abnormal vaginal bleeding, excessive menstruation, menstrual changes, hot flashes, vaginal dryness and postmenopausal bleeding.   Musculoskeletal:  Negative for myalgias, back pain, joint swelling, arthralgias, stiffness, muscle cramps, neck pain, bone pain, muscle weakness and fracture.   Skin:  Negative for nail changes, itching, poor wound healing, rash, hair changes, skin changes, acne, warts, poor wound healing, scarring, flaky skin, Raynaud's phenomenon, sensitivity to sunlight and skin thickening.   Neurological:  Positive for tingling, numbness and headaches. Negative for dizziness, tremors, speech change, seizures, loss of consciousness, weakness, light-headedness, disturbances in coordination, memory loss, difficulty walking and paralysis.   Endo/Heme:  Negative for anemia, swollen glands and bruises/bleeds easily.   Psychiatric/Behavioral:  Positive for depression and mood swings. Negative for hallucinations, memory loss, decreased concentration and panic attacks.    Breast:  Negative for breast discharge, breast mass, breast pain and nipple retraction.   Endocrine:  Negative for altered temperature regulation, polyphagia, polydipsia, unwanted hair growth and change in facial hair.    I have personally reviewed and updated the complete ROS on the day of the visit.           Physical Exam:   /80   Pulse 84   Ht 1.556 m (5' 1.25\")   Wt 52.9 kg (116 lb 9.6 oz)   SpO2 99%   BMI 21.85 kg/m    Body mass index is 21.85 kg/m .  Vitals were reviewed       Physical Exam  Constitutional:       General: She is not in acute distress.     Appearance: She is normal weight.   HENT:      Head: Normocephalic and atraumatic.   Eyes:      General: No scleral icterus.  Cardiovascular:      Rate and Rhythm: Normal rate and regular rhythm.      Pulses: Normal pulses.      Heart sounds: Normal heart sounds. No murmur heard.    No " gallop.   Pulmonary:      Effort: Pulmonary effort is normal. No respiratory distress.      Breath sounds: Normal breath sounds. No wheezing or rales.   Abdominal:      General: Abdomen is flat. Bowel sounds are normal. There is no distension.      Palpations: Abdomen is soft.      Tenderness: There is no abdominal tenderness. There is no guarding.   Musculoskeletal:         General: No edema.      Right lower leg: No edema.      Left lower leg: No edema.   Skin:     Capillary Refill: Capillary refill takes less than 2 seconds.      Comments: No rashes noted along b/l LE   Neurological:      Mental Status: She is alert.           Assessment and Plan     #Crohn's disease  Diagnosed in 2004 at [facility], s/p small bowel resection in 2005. Was on 6-MP, prednisone, entocort, switched to remicade after a flare. Stayed on remicade for 10 years, switched to Humira in 2016 due to insurance reasons. Was in remission on Humira wihout symptoms. Was in discussion with her GI doctor to come off Humira, then had a CVA in 12/2021 and has been off Humira since. Endorses some mild increase in stool (from 2-3 BM/day to 3-4 BM/day), tends to be 2-4 on [place] stool chart, occassional tenesmus, two episodes of bloody stool since 12/2021. Last colonoscopy 2017 with disease remission. Patient would benefit from further workup to evaluate inflammatory burden to determine if needs to be back on medication for Crohn's  - Plan for colonoscopy and MRE  - Fecal calprotectin, ESR, CRP  - Will check CBC, CMP, TSH  - Nutritional markers with Vit D, B9, B12, iron panel. Will also check tTg IgA and IgG  - Will check TB quantiferon gold, Hep B and Hep C if patient ends up needing to go back on medication  - Will try to reach out with her GI doctor in [place], Dr. Sukhwinder Teresa at [facility], to obtain further records  - RTC in 6 months    Options for treatment and follow-up care were reviewed with the patient. Macrina Gonzales engaged  in the decision making process and verbalized understanding of the options discussed and agreed with the final plan.      Pt was seen and plan of care discussed with Dr. Israel Gould, PGY1  Sep 22, 2022

## 2022-09-22 NOTE — PATIENT INSTRUCTIONS
It is good to meet you today. I am so glad you are doing well.    Check labs today    Check stool study (if not able to submit today and take sample to [facility])    My office will contact you about scheduling the colonoscopy    Call to schedule MRI    Start citrucel 1 tsp daily in a glass of water. Slowly increase up to 3 tsp (1 tablespoon) daily    We will work to get your records    Follow up in 6 months With Israel

## 2022-09-22 NOTE — NURSING NOTE
"Chief Complaint   Patient presents with     New Patient       Vitals:    09/22/22 0934   BP: 114/80   Pulse: 84   SpO2: 99%   Weight: 52.9 kg (116 lb 9.6 oz)   Height: 1.556 m (5' 1.25\")       Body mass index is 21.85 kg/m .    Alysia Paulson MA    "

## 2022-09-22 NOTE — LETTER
9/22/2022         RE: Macrina Gonzales  [address] [facility] [address]        Dear Colleague,    Thank you for referring your patient, Macrina Gonzales, to the [facility] GASTROENTEROLOGY CLINIC [place]. Please see a copy of my visit note below.      Gastroenterology Clinic        HPI:       Macrina Gonzales is a 37 year old female who presents to [facility] care after moving from [place]    She has a history of Crohn's disease, diagnosed in 2004 at [facility] after having severe abdominal pain. She was on 6-MP, prednisone, and entocort. A few years later she had a flare and had a small bowel resection done. After another flare, she was switched to remicade which she stayed on for ~10 years. During that time her Crohn's was mostly under control. Due to insurance issues and difficulty receiving prior authorization every time she needed remicade, she switched to Humira in 8/2016, and her Crohn's remained well controlled. She suffered a CVA in 12/2021 and afterwards she stopped taking her Humira. This was a discussion already in place with her GI doctor in [place] to discontinue the Humira given her remission and see how she would do, with plan to do colonoscopy in the summer of 2022, however she moved to [place]    She says her typical BM is 2-3 stools per day, 4 on the [facility] stool chart. Says recently she has been having slightly more stools, maybe 3-4 per day, tends to be 2-4 on [facility] stool chart. Also endorses occasional tenesmus. Says she has had occassional blood in the stool, red, small amount, has happened twice since coming off Humira. She denies any extra-intestinal symptoms.     Her last colonoscopy on record was in 8/2017, confirmed disease remission with negative biopsy results. She says she had another colonoscopy in 2019 at [place] that also showed disease remission, although no records of this currently      Problem, Medication and Allergy Lists      Patient Active Problem  List    Diagnosis Date Noted     Abnormal CT of liver 07/11/2022     Priority: Medium     Vertebral artery dissection (H) 07/11/2022     Priority: Medium     Family history of malignant neoplasm of breast 07/11/2022     Priority: Medium     Lung granuloma (H) 07/11/2022     Priority: Medium     Paresthesias 07/11/2022     Priority: Medium     left side, primarily leg       Double vision 12/30/2021     Priority: Medium     Cerebrovascular accident (CVA) due to occlusion of right vertebral artery (H) 12/24/2021     Priority: Medium     Formatting of this note is different from the original.  12/28/2021 discharge -clopidogrel 75mg daily. ASA switched to 81mg QD on discharge. Per neuro will need to continue DAPT for 3-6 months. Will need stroke clinic follow-up in ~6 weeks and repeat imaging in three months.    Neurology 1/22 -Recommendations:  1) Further neurodiagnostic work up:   - Repeat CTA head and neck in April 2022.   - Obtain CTA Chest, abdomen and pelvis to evaluate for FMD related changes.      2) Anti platelet therapy or Anti coagulation:   - Continue dual antiplatelet therapy for now- Aspirin 81 + Plavix 75 mg daily.   - Based on the results of follow up CTA, Plavix can be discontinued. Note that she is not breastfeeding since her stroke.     Last Assessment & Plan:   Formatting of this note might be different from the original.  Discussed her ongoing improvement from her stroke in December. Physical improvement is more rapid than cognitive improvement. My recommendation for her is to stay off completely off of work until April 1. She can then return with limitations of 20 hours/week for 4 weeks. As of May 1 you can return to full work. This may need to be modified in the future depending on her recovery.       Herpes zoster without complication 11/13/2019     Priority: Medium     Last Assessment & Plan:   Formatting of this note might be different from the original.  Discussed diagnosis of shingles, more  "likely with her being on Humira.  She will contact her GI doctor to let him know if her diagnosis.  She is not due for another Humira injection for 2 weeks  Patient is started on valacyclovir 1 g 3 times daily x7 days, discussed symptomatic treatment calling for worsening pain, contagiousness       Raynaud's disease without gangrene 04/22/2019     Priority: Medium     Last Assessment & Plan:   Formatting of this note might be different from the original.  Reviewed treatment options, pharmaceutical and nonpharmaceutical.  We will proceed with trial of amlodipine at 2.5 mg daily discussed precautions about hypotension given her baseline blood pressure.  She will let us know how this is doing over the next 3-4 weeks       Vitamin D deficiency 04/22/2019     Priority: Medium     Last Assessment & Plan:   Formatting of this note might be different from the original.  Prescribe 50,000 units weekly, needs blood test may need to change to monthly       CAMELIA I (cervical intraepithelial neoplasia I) 01/16/2018     Priority: Medium     10/30/17 LSIL Pap  1/8/18 Colposcopy: Bx CIN1 ECC: \"minute fragments of mildly atypical endocervical epithelium...most likely representative of reactive/degenerative change\"Plan: Cotesting 1 year  06/20/22 NIL Pap, Neg HR HPV   07/11/22 NIL Pap, Neg HR HPV Plan cotest in 3 years.        Abnormal mammogram 12/27/2017     Priority: Medium     Dense breast tissue on mammogram 12/27/2017     Priority: Medium     Fibrocystic breast changes 12/27/2017     Priority: Medium     Papanicolaou smear of cervix with low grade squamous intraepithelial lesion (LGSIL) 11/13/2017     Priority: Medium     10/30/17, HPV other high risk pos, 16/18neg. Referred for colpo w/ S. CHRISTIAN Guzman.       Crohn's disease of small intestine (H) 11/11/2001     Priority: Medium         Current Outpatient Medications   Medication Sig Dispense Refill     clopidogrel (PLAVIX) 75 MG tablet Take 1 tablet (75 mg) by mouth daily 90 " tablet 1     Omega-3 Fatty Acids (FISH OIL PO)        vitamin C (ASCORBIC ACID) 100 MG tablet Take 100 mg by mouth       VITAMIN D, CHOLECALCIFEROL, PO        Prenatal Vit-Fe Fumarate-FA (PRENATAL MULTIVITAMIN W/IRON) 27-0.8 MG tablet Take 1 tablet by mouth daily (Patient not taking: Reported on 9/22/2022)         No Known Allergies            Review of Systems:     Patient submitted ROS below  Review of Systems     Constitutional:  Negative for fever, chills, weight loss, weight gain, fatigue, decreased appetite, night sweats, recent stressors, height gain, height loss, post-operative complications, incisional pain, hallucinations, increased energy, hyperactivity and confused.   HENT:  Negative for ear pain, hearing loss, tinnitus, nosebleeds, trouble swallowing, hoarse voice, mouth sores, sore throat, ear discharge, tooth pain, gum tenderness, taste disturbance, smell disturbance, hearing aid, bleeding gums, dry mouth, sinus pain, sinus congestion and neck mass.    Eyes:  Negative for double vision, pain, redness, eye pain, decreased vision, eye watering, eye bulging, eye dryness, flashing lights, spots, floaters, strabismus, tunnel vision, jaundice and eye irritation.   Respiratory:   Negative for cough, hemoptysis, sputum production, shortness of breath, wheezing, sleep disturbances due to breathing, snores loudly, respiratory pain, dyspnea on exertion, cough disturbing sleep and postural dyspnea.    Cardiovascular:  Negative for chest pain, dyspnea on exertion, palpitations, orthopnea, claudication, leg swelling, fingers/toes turn blue, hypertension, hypotension, syncope, history of heart murmur, chest pain on exertion, chest pain at rest, pacemaker, few scattered varicosities, leg pain, sleep disturbances due to breathing, tachycardia, light-headedness, exercise intolerance and edema.   Gastrointestinal:  Negative for heartburn, nausea, vomiting, abdominal pain, diarrhea, constipation, blood in stool, melena,  "rectal pain, bloating, hemorrhoids, bowel incontinence, jaundice, rectal bleeding, coffee ground emesis and change in stool.   Genitourinary:  Negative for bladder incontinence, dysuria, urgency, hematuria, flank pain, vaginal discharge, difficulty urinating, genital sores, dyspareunia, decreased libido, nocturia, voiding less frequently, arousal difficulty, abnormal vaginal bleeding, excessive menstruation, menstrual changes, hot flashes, vaginal dryness and postmenopausal bleeding.   Musculoskeletal:  Negative for myalgias, back pain, joint swelling, arthralgias, stiffness, muscle cramps, neck pain, bone pain, muscle weakness and fracture.   Skin:  Negative for nail changes, itching, poor wound healing, rash, hair changes, skin changes, acne, warts, poor wound healing, scarring, flaky skin, Raynaud's phenomenon, sensitivity to sunlight and skin thickening.   Neurological:  Positive for tingling, numbness and headaches. Negative for dizziness, tremors, speech change, seizures, loss of consciousness, weakness, light-headedness, disturbances in coordination, memory loss, difficulty walking and paralysis.   Endo/Heme:  Negative for anemia, swollen glands and bruises/bleeds easily.   Psychiatric/Behavioral:  Positive for depression and mood swings. Negative for hallucinations, memory loss, decreased concentration and panic attacks.    Breast:  Negative for breast discharge, breast mass, breast pain and nipple retraction.   Endocrine:  Negative for altered temperature regulation, polyphagia, polydipsia, unwanted hair growth and change in facial hair.    I have personally reviewed and updated the complete ROS on the day of the visit.           Physical Exam:   /80   Pulse 84   Ht 1.556 m (5' 1.25\")   Wt 52.9 kg (116 lb 9.6 oz)   SpO2 99%   BMI 21.85 kg/m    Body mass index is 21.85 kg/m .  Vitals were reviewed       Physical Exam  Constitutional:       General: She is not in acute distress.     Appearance: She " is normal weight.   HENT:      Head: Normocephalic and atraumatic.   Eyes:      General: No scleral icterus.  Cardiovascular:      Rate and Rhythm: Normal rate and regular rhythm.      Pulses: Normal pulses.      Heart sounds: Normal heart sounds. No murmur heard.    No gallop.   Pulmonary:      Effort: Pulmonary effort is normal. No respiratory distress.      Breath sounds: Normal breath sounds. No wheezing or rales.   Abdominal:      General: Abdomen is flat. Bowel sounds are normal. There is no distension.      Palpations: Abdomen is soft.      Tenderness: There is no abdominal tenderness. There is no guarding.   Musculoskeletal:         General: No edema.      Right lower leg: No edema.      Left lower leg: No edema.   Skin:     Capillary Refill: Capillary refill takes less than 2 seconds.      Comments: No rashes noted along b/l LE   Neurological:      Mental Status: She is alert.           Assessment and Plan     #Crohn's disease  Diagnosed in 2004 at [facility], s/p small bowel resection in 2005. Was on 6-MP, prednisone, entocort, switched to remicade after a flare. Stayed on remicade for 10 years, switched to Humira in 2016 due to insurance reasons. Was in remission on Humira wihout symptoms. Was in discussion with her GI doctor to come off Humira, then had a CVA in 12/2021 and has been off Humira since. Endorses some mild increase in stool (from 2-3 BM/day to 3-4 BM/day), tends to be 2-4 on [facility] stool chart, occassional tenesmus, two episodes of bloody stool since 12/2021. Last colonoscopy 2017 with disease remission. Patient would benefit from further workup to evaluate inflammatory burden to determine if needs to be back on medication for Crohn's  - Plan for colonoscopy and MRE  - Fecal calprotectin, ESR, CRP  - Will check CBC, CMP, TSH  - Nutritional markers with Vit D, B9, B12, iron panel. Will also check tTg IgA and IgG  - Will check TB quantiferon gold, Hep B and Hep C if patient ends up needing to  go back on medication  - Will try to reach out with her GI doctor in [place], Dr. Sukhwinder Teresa [facility], to obtain further records  - RTC in 6 months    Options for treatment and follow-up care were reviewed with the patient. Macrina RHODES Avon engaged in the decision making process and verbalized understanding of the options discussed and agreed with the final plan.      Pt was seen and plan of care discussed with Dr. Israel Gould, PGY1  Sep 22, 2022      Attestation signed by Gumaro Wood MD at 10/6/2022 11:17 AM:  ATTENDING ATTESTATION:     DATE SEEN: 9/22/22    Patient was discussed, seen, and examined by me, Gumaro Wood. The plan of care and pertinent data/imaging were reviewed with Dr. Gould. I agree with the assessment and plan as delineated above with the following additions:     She is doing very well from a CD perspective off all therapy (stooped after CVA). We will plan to restage with labs, fecal calprotectin, colonoscopy and MRE. If she continues to be in remission off therapy we can continue to cautiously monitor. If she has evidence of active disease then we will need to discuss restarting adalimumab or alternative biologic therapy.    Please contact me with any further questions.          Again, thank you for allowing me to participate in the care of your patient.      Sincerely,    Gumaro Wood MD

## 2022-09-23 ENCOUNTER — TELEPHONE (OUTPATIENT)
Dept: GASTROENTEROLOGY | Facility: CLINIC | Age: 37
End: 2022-09-23

## 2022-09-23 LAB
HBV CORE AB SERPL QL IA: NONREACTIVE
HBV SURFACE AB SERPL IA-ACNC: 102.66 M[IU]/ML
HBV SURFACE AB SERPL IA-ACNC: REACTIVE M[IU]/ML
HBV SURFACE AG SERPL QL IA: NONREACTIVE
HCV AB SERPL QL IA: NONREACTIVE
IGA SERPL-MCNC: 520 MG/DL (ref 84–499)

## 2022-09-26 ENCOUNTER — TELEPHONE (OUTPATIENT)
Dept: GASTROENTEROLOGY | Facility: CLINIC | Age: 37
End: 2022-09-26

## 2022-09-26 LAB
CALPROTECTIN STL-MCNT: 10.9 MG/KG (ref 0–49.9)
GAMMA INTERFERON BACKGROUND BLD IA-ACNC: 0.09 IU/ML
M TB IFN-G BLD-IMP: NEGATIVE
M TB IFN-G CD4+ BCKGRND COR BLD-ACNC: 9.91 IU/ML
MITOGEN IGNF BCKGRD COR BLD-ACNC: -0.02 IU/ML
MITOGEN IGNF BCKGRD COR BLD-ACNC: 0 IU/ML
QUANTIFERON MITOGEN: 10 IU/ML
QUANTIFERON NIL TUBE: 0.09 IU/ML
QUANTIFERON TB1 TUBE: 0.07 IU/ML
QUANTIFERON TB2 TUBE: 0.09
TPMT BLD-CCNC: 32.6 U/ML
TTG IGA SER-ACNC: 1.5 U/ML
TTG IGG SER-ACNC: <0.6 U/ML

## 2022-09-26 NOTE — TELEPHONE ENCOUNTER
Screening Questions  BLUE  KIND OF PREP RED  LOCATION [review exclusion criteria] GREEN  SEDATION TYPE        Y Are you active on mychart?       SHARLA Ordering/Referring Provider?        [employer]ET[employer]/P1 What type of coverage do you have?      N Have you had a positive covid test in the last 90 days?        Date:    1. 21.7  BMI  [BMI 40+ - review exclusion criteria]  2. Y  Are you able to give consent for your medical care? [RN REVIEW?]        3. N  Are you taking any prescription pain medications on a routine schedule?        3a.  EXTENDED PREP What kind of prescription?   4. N Do you have any chemical dependencies such as alcohol, street drugs, or methadone?    5. N Do you have any history of post-traumatic stress syndrome, severe anxiety or history of psychosis?      **If yes 2- 5 , please schedule with MAC sedation.**    BMI OVER 40 NEED PAC EVALUATION FOR [facility]          IF YES TO ANY 6 - 10 - HOSPITAL SETTING ONLY.     6.   N Do you need assistance transferring?     7.   N Have you had a heart or lung transplant?    8.   N Are you currently on dialysis?   9.   N Do you use daily home oxygen?   10. N Do you take nitroglycerin?   10a.  If yes, how often?     11. [FEMALES]  N Are you currently pregnant?    11a.  If yes, how many weeks? [ Greater than 12 weeks, OR NEEDED]    12. N Do you have Pulmonary Hypertension? *NEED PAC APPT AT [facility]*     13. N [review exclusion criteria]  Do you have any implantable devices in your body (pacemaker, defib, LVAD)?    14. N In the past 6 months, have you had any heart related issues including cardiomyopathy or heart attack?     14a.  If yes, did it require cardiac stenting if so when?     15. N Have you had a stroke or Transient ischemic attack (TIA - aka  mini stroke ) within 6 months?  STROKE IN December 2021    16. N Do you have mod to severe Obstructive Sleep Apnea?  [Hospital only - Ok at [place]]    17. N Do you have SEVERE AND UNCONTROLLED asthma?     18. Y Are you  "currently taking any blood thinners?     18a. If yes, inform patient to \"follow up w/ ordering provider for bridging instructions.\"    19. N Do you take the medication Phentermine?    19a. If yes, \"Hold for 7 days before procedure.  Please consult your prescribing provider if you have questions about holding this medication.\"     20. N  Do you have chronic kidney disease?      21. N  Do you have a diagnosis of diabetes?     22. N  On a regular basis do you go 3-5 days between bowel movements?     23.  Preferred LOCAL Pharmacy for Pre Prescription    [ LIST ONLY ONE PHARMACY]    [id number] - [place], [place] - [address] AT [facility] [address]        - [facility] REMINDERS -    Informed patient they will need an adult    Cannot take any type of public or medical transportation alone    Conscious Sedation- Needs  for 6 hours after the procedure       MAC/General-Needs  for 24 hours after procedure    Pre-Procedure Covid test to be completed [[facility] PCR Testing Required]    Confirmed Nurse will call to complete pre-assessment       - SCHEDULING DETAILS -     SHARLA  Surgeon    12/6  Date of Procedure  Lower Endoscopy [Colonoscopy]  Type of Procedure Scheduled   [facility] Location  [facility] PREP-If you answer yes to questions #8, #20, #21Which Colonoscopy Prep was Sent?     MAC Sedation Type     N PAC / Pre-op Required         Additional comments:        "

## 2022-10-03 ENCOUNTER — HEALTH MAINTENANCE LETTER (OUTPATIENT)
Age: 37
End: 2022-10-03

## 2022-10-20 ENCOUNTER — PRE VISIT (OUTPATIENT)
Dept: GASTROENTEROLOGY | Facility: CLINIC | Age: 37
End: 2022-10-20

## 2022-11-08 ENCOUNTER — PRE VISIT (OUTPATIENT)
Dept: NEUROLOGY | Facility: CLINIC | Age: 37
End: 2022-11-08

## 2022-11-08 ENCOUNTER — VIRTUAL VISIT (OUTPATIENT)
Dept: NEUROLOGY | Facility: CLINIC | Age: 37
End: 2022-11-08
Attending: FAMILY MEDICINE
Payer: COMMERCIAL

## 2022-11-08 DIAGNOSIS — I63.211 CEREBROVASCULAR ACCIDENT (CVA) DUE TO OCCLUSION OF RIGHT VERTEBRAL ARTERY (H): ICD-10-CM

## 2022-11-08 DIAGNOSIS — I77.74 VERTEBRAL ARTERY DISSECTION (H): ICD-10-CM

## 2022-11-08 PROCEDURE — 99204 OFFICE O/P NEW MOD 45 MIN: CPT | Mod: 95 | Performed by: PSYCHIATRY & NEUROLOGY

## 2022-11-08 NOTE — LETTER
11/8/2022       RE: Macrina Gonzales  [address]  [place] [address]     Dear Colleague,    Thank you for referring your patient, Macrina Gonzales, to the [facility] NEUROLOGY CLINIC [place] [facility]. Please see a copy of my visit note below.          [facility] NEUROLOGY CL[facility]IC [address]  3RD FLO[address]  [facility] [id number] [phone]          November 8, 2022    Macrina Gonzales                                                                                                                     [address]  [place] [address]    TOTAL 50 minutes  VIDEO VISIT: 11:05 AM - 11:32 = 27 minutes  Documentation: 23 minutes      Ms. Gonzales is a 37 year old lady who was initial diagnosed with R vertebral artery dissection and also had a R posterior medullary infarct. There was no clear history of head trauma or chiropracty preceding the dissection though the patient reports that she had significant coughing in that period and this was thought to be the etiology. The patient's symptoms at that time included extreme dizziness and a sense of pressure at the back of her head and imbalance. She also had some L sided numbness and tingling. She is currently on Plavix 75 mg daily but is not taking that regularly. The patient reports that her symptoms have largely resolved but that she has felt a sensation of pressure at the back of her head. 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 She has one child and is not on hormone contraception. She does not smoke tobacco but uses marijuana for anxiety and this anxiety has been long-standing from even her teen years.    ASSESSMENT / PLAN    Encounter Diagnoses   Name Primary?     Cerebrovascular accident (CVA) due to occlusion of right vertebral artery (H)      Vertebral artery dissection (H)      Orders Placed This  Encounter   Procedures     CT Angiogram head neck w & w/o contrast     This young lady has had a vertebral artery dissection. This was likely due to severe coughing which has been associated with dissection. In an April scan, it appears that the dissection has improved - the R vertebral artery seems to end in the PICA. She has had some recent symptoms. I have ordered a repeat CTA. If that is stable - we can consider stopping the antiplatelet. Until then she should remain on Plavix or 81 mg of aspirin. We discussed potential normal scenarios where dissection has been reported. These include heavy lifting, trauma including MVA, sometimes even roller coaster rides, ATV riding, coughing and constipation with straining. The patient will consider getting a wheely bag so she is not straining with her work bag. I have ordered a CTA and if that is normal - no follow up with me is needed. If dissection is persistent, we will consider further angiogram and interventional referral.    1/13/22  IMPRESSION:       Redemonstration of right V3 vertebral dissection, incrementally more conspicuous than on prior imaging.  The V4 segment is once again not well seen.     No new stenoses or arterial truncation.     4/15/22  MPRESSION:       Stable appearance of the short segment of slight vessel wall irregularity and narrowing within the distal aspect of the right vertebral artery.  This corresponds to the prior reported possible vertebral artery dissection.  The intracranial segment of the    right vertebral artery predominantly terminates into the posterior inferior cerebellar artery.  The distalmost aspect of the intracranial segment of the right vertebral artery is not well seen.  A very faint branch may potentially extend to join the   basilar artery.     The right and left common carotid arteries and internal carotid arteries are widely patent.  The left vertebral artery is widely patent.  The remainder of the intracranial arterial  circulation is widely patent.     CTA 12/24/22  IMPRESSION:       Irregularity and attenuation of the V3 segment of the right vertebral artery with near complete stenosis of the V4 segment.  Findings are suspicious for dissection.  The posterior cerebral and cerebellar arteries appear to opacify normally.      MRI 12/24/22  IMPRESSION:     Linear diffusion abnormality in the right posterior medulla oblongata at the level of the inferior cerebellar peduncle is compatible with ischemic insult.     Otherwise cerebrum and cerebellum are within expected appearance.     1/3/22  IMPRESSION:     No acute findings.  No evidence of infarction.       Current Outpatient Medications   Medication     clopidogrel (PLAVIX) 75 MG tablet     Omega-3 Fatty Acids (FISH OIL PO)     vitamin C (ASCORBIC ACID) 100 MG tablet     VITAMIN D (CHOLECALCIFEROL) PO     Prenatal Vit-Fe Fumarate-FA (PRENATAL MULTIVITAMIN W/IRON) 27-0.8 MG tablet     No current facility-administered medications for this visit.         Past Medical History:   Diagnosis Date     Cerebral infarction (H) December 23, 2021    Right vertebral artery dissection         EXAM  Orientation: Normal; Language normal; Attention: Serial 7: 5/5; normal  Cranial nerves: EOMI face symmetric tongue ML shoulder shrug normal    Motor: FFM normal bilaterally; No drift; Strength antigravity or better in both UE and LE  Sensory: no deficits to LT reported  Co-ordination normal in both UE   Gait: normal base steady can walk on heels toes and tandem without difficulty        Bipin Smith MD

## 2022-11-08 NOTE — PROGRESS NOTES
[facility] NEUROLOGY CLINIC [address]  [address] [facility] [address] [phone]          November 8, 2022    Macrina Gonzales                                                                                                                     [address] [facility] [address]    TOTAL 50 minutes  VIDEO VISIT: 11:05 AM - 11:32 = 27 minutes  Documentation: 23 minutes      Ms. Gonzales is a 37 year old lady who was initial diagnosed with R vertebral artery dissection and also had a R posterior medullary infarct. There was no clear history of head trauma or chiropracty preceding the dissection though the patient reports that she had significant coughing in that period and this was thought to be the etiology. The patient's symptoms at that time included extreme dizziness and a sense of pressure at the back of her head and imbalance. She also had some L sided numbness and tingling. She is currently on Plavix 75 mg daily but is not taking that regularly. The patient reports that her symptoms have largely resolved but that she has felt a sensation of pressure at the back of her head. 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 She has one child and is not on hormone contraception. She does not smoke tobacco but uses marijuana for anxiety and this anxiety has been long-standing from even her teen years.    ASSESSMENT / PLAN    Encounter Diagnoses   Name Primary?     Cerebrovascular accident (CVA) due to occlusion of right vertebral artery (H)      Vertebral artery dissection (H)      Orders Placed This Encounter   Procedures     CT Angiogram head neck w & w/o contrast     This young lady has had a vertebral artery dissection. This was likely due to severe coughing which has been associated with dissection. In an April scan, it appears that the dissection has improved - the R vertebral artery seems to end in the  PICA. She has had some recent symptoms. I have ordered a repeat CTA. If that is stable - we can consider stopping the antiplatelet. Until then she should remain on Plavix or 81 mg of aspirin. We discussed potential normal scenarios where dissection has been reported. These include heavy lifting, trauma including MVA, sometimes even roller coaster rides, ATV riding, coughing and constipation with straining. The patient will consider getting a wheely bag so she is not straining with her work bag. I have ordered a CTA and if that is normal - no follow up with me is needed. If dissection is persistent, we will consider further angiogram and interventional referral.    1/13/22  IMPRESSION:       Redemonstration of right V3 vertebral dissection, incrementally more conspicuous than on prior imaging.  The V4 segment is once again not well seen.     No new stenoses or arterial truncation.     4/15/22  MPRESSION:       Stable appearance of the short segment of slight vessel wall irregularity and narrowing within the distal aspect of the right vertebral artery.  This corresponds to the prior reported possible vertebral artery dissection.  The intracranial segment of the    right vertebral artery predominantly terminates into the posterior inferior cerebellar artery.  The distalmost aspect of the intracranial segment of the right vertebral artery is not well seen.  A very faint branch may potentially extend to join the   basilar artery.     The right and left common carotid arteries and internal carotid arteries are widely patent.  The left vertebral artery is widely patent.  The remainder of the intracranial arterial circulation is widely patent.     CTA 12/24/22  IMPRESSION:       Irregularity and attenuation of the V3 segment of the right vertebral artery with near complete stenosis of the V4 segment.  Findings are suspicious for dissection.  The posterior cerebral and cerebellar arteries appear to opacify normally.      MRI  12/24/22  IMPRESSION:     Linear diffusion abnormality in the right posterior medulla oblongata at the level of the inferior cerebellar peduncle is compatible with ischemic insult.     Otherwise cerebrum and cerebellum are within expected appearance.     1/3/22  IMPRESSION:     No acute findings.  No evidence of infarction.       Current Outpatient Medications   Medication     clopidogrel (PLAVIX) 75 MG tablet     Omega-3 Fatty Acids (FISH OIL PO)     vitamin C (ASCORBIC ACID) 100 MG tablet     VITAMIN D (CHOLECALCIFEROL) PO     Prenatal Vit-Fe Fumarate-FA (PRENATAL MULTIVITAMIN W/IRON) 27-0.8 MG tablet     No current facility-administered medications for this visit.         Past Medical History:   Diagnosis Date     Cerebral infarction (H) December 23, 2021    Right vertebral artery dissection         EXAM  Orientation: Normal; Language normal; Attention: Serial 7: 5/5; normal  Cranial nerves: EOMI face symmetric tongue ML shoulder shrug normal    Motor: FFM normal bilaterally; No drift; Strength antigravity or better in both UE and LE  Sensory: no deficits to LT reported  Co-ordination normal in both UE   Gait: normal base steady can walk on heels toes and tandem without difficulty      Bipin Smith MD

## 2022-11-08 NOTE — PROGRESS NOTES
Macrina is a 37 year old who is being evaluated via a billable video visit.      How would you like to obtain your AVS? Mychart  If the video visit is dropped, the invitation should be resent by: [phone]        Video-Visit Details    Video Start Time: 11:05    Type of service:  Video Visit    Video End Time:11:32    Originating Location (pt. Location): Home      Distant Location (provider location):  Off-site    Platform used for Video Visit: Sushila

## 2022-11-28 ENCOUNTER — TELEPHONE (OUTPATIENT)
Dept: GASTROENTEROLOGY | Facility: CLINIC | Age: 37
End: 2022-11-28

## 2022-11-28 DIAGNOSIS — K50.00 CROHN'S DISEASE OF SMALL INTESTINE WITHOUT COMPLICATION (H): Primary | ICD-10-CM

## 2022-11-28 RX ORDER — BISACODYL 5 MG
TABLET, DELAYED RELEASE (ENTERIC COATED) ORAL
Qty: 4 TABLET | Refills: 0 | Status: SHIPPED | OUTPATIENT
Start: 2022-11-28 | End: 2023-03-08

## 2022-11-28 NOTE — TELEPHONE ENCOUNTER
Attempted to contact patient regarding upcoming Colonoscopy procedure on 12/6/22 for pre assessment questions. No answer.     Left message to return call to [phone] #[phone]    TONY RESTREPO RN

## 2022-11-28 NOTE — TELEPHONE ENCOUNTER
Patient scheduled for Colonoscopy on 12/6/22.     Covid test scheduled? No. Discuss at home test option.     Pre op exam scheduled: N/A    Arrival time: 1020    [place] [facility] [place]    Sedation type: MAC    Indication for procedure: Crohn's    Anticoagulations? Plavix, Holding interval of 5 days    Diabetic? No. Advise to hold oral diabetic medications day of procedure if applicable.     Bowel prep recommendation: Golytely d/t mag citrate recall    Golytely prep sent to    [facility]/PHARMACY #[id number] [place], [place] - [address] & [place] [facility].     Prep instructions sent via [employer].    Pre visit planning completed.    TONY RESTREPO RN

## 2022-11-30 NOTE — TELEPHONE ENCOUNTER
Pre assessment questions completed for upcoming colonoscopy  procedure scheduled on 12.6.22    COVID policy reviewed.     Reviewed procedural arrival time 1020 and facility location [facility]; [address], [address], [place] [address]    Designated  policy reviewed. Instructed to have someone stay 24 hours post procedure. Anticoagulation/blood thinners? Yes Clopidogrel (Plavix). Holding interval of 5 days - message out to Bipin Smith MD     Electronic implanted devices? No    Diabetic? No    Reviewed procedure prep instructions.     Patient verbalized understanding and had no questions or concerns at this time.    Eleonora Prieto RN

## 2022-12-02 ENCOUNTER — TELEPHONE (OUTPATIENT)
Dept: GASTROENTEROLOGY | Facility: CLINIC | Age: 37
End: 2022-12-02

## 2022-12-02 NOTE — TELEPHONE ENCOUNTER
Caller: Macrina Gonzales    Procedure: Colonoscopy    Date, Location, and Surgeon of Procedure Cancelled: 12/6/22/ [place]/ EMILY    Ordering Provider:[place]    Reason for cancel (please be detailed, any staff messages or encounters to note?): Patient could not make appointment        Rescheduled: y     If rescheduled:    Date: 2/28/23   Location: [facility] [place]   Prep Resent: n   Covid Test Rescheduled: n   Note any change or update to original order/sedation:

## 2022-12-02 NOTE — TELEPHONE ENCOUNTER
Call placed to patient to discuss below. Patient states ended up rescheduling the colonoscopy for 2.28.23 and will be having the head CT done prior to that.     LINA Hagan Kamakshi, MD Arola, Tracy, RN; Delilah Rodriguez RN  My understanding is that she has not been taking her plavix. So that may answer your question.   I personally cannot say if she should stop her plavix because she has not done the scan I had ordered to see if the dissection was fully resolved.           Previous Messages     ----- Message -----   From: Eleonora Prieto RN   Sent: 12/2/2022   8:57 AM CST   To: Bipin Smith MD, *   Subject: RE: Plavix                                       Is she able to hold the Plavix starting today in your professional opinion? If so, I will call the patient to let her know. If not, then we will need her to reschedule.     Thank you,     Eleonora Prieto RN   ----- Message -----   From: Bipin Smith MD   Sent: 12/1/2022  10:09 PM CST   To: Eleonora Prieto RN, Delilah Rodriguez, RN   Subject: RE: Plavix                                       Hi - I had advised this patient to have a repeat scan (CTA of head and neck) to help with decision making. She has not done that - I am not sure why. She is not taking her antiplatelet regularly. I was not the prescribing physician but did let her know that she should repeat her CTA so I can stop the antiplatelet for good if she is healed. I am copying Ms. Rodriguez who is our nurse - please copy her on all communications     Thank you   Dr. Sherman   ----- Message -----

## 2022-12-12 ENCOUNTER — OFFICE VISIT (OUTPATIENT)
Dept: FAMILY MEDICINE | Facility: CLINIC | Age: 37
End: 2022-12-12
Payer: COMMERCIAL

## 2022-12-12 VITALS
SYSTOLIC BLOOD PRESSURE: 122 MMHG | OXYGEN SATURATION: 99 % | DIASTOLIC BLOOD PRESSURE: 86 MMHG | BODY MASS INDEX: 21.18 KG/M2 | TEMPERATURE: 98.6 F | WEIGHT: 113 LBS | HEART RATE: 103 BPM | RESPIRATION RATE: 16 BRPM

## 2022-12-12 DIAGNOSIS — R07.0 THROAT PAIN: ICD-10-CM

## 2022-12-12 DIAGNOSIS — R09.89 SUSPECTED NOVEL INFLUENZA A VIRUS INFECTION: Primary | ICD-10-CM

## 2022-12-12 LAB
DEPRECATED S PYO AG THROAT QL EIA: NEGATIVE
GROUP A STREP BY PCR: NOT DETECTED

## 2022-12-12 PROCEDURE — 99213 OFFICE O/P EST LOW 20 MIN: CPT | Performed by: PHYSICIAN ASSISTANT

## 2022-12-12 PROCEDURE — 87651 STREP A DNA AMP PROBE: CPT | Performed by: PHYSICIAN ASSISTANT

## 2022-12-12 NOTE — LETTER
December 12, 2022      Macrina Gonzales  [address] [facility] [address]        To Whom It May Concern:    Macrina Gonzales  was seen on 12/12/22.  Please excuse her for her absence from work.  Expected time away is 1 to 7 days.        Sincerely,        Zohra Arnold PA-C

## 2022-12-12 NOTE — PROGRESS NOTES
Patient presents with:  Pharyngitis: X4 days    Chills: X4 days    Headache      Clinical Decision Making:  Sick symptoms for 4 days.  Rapid strep test negative.  Known exposure to influenza.  Suspect influenza.  Physical exam is benign.  Patient outside of treatment time window for Tamiflu.  Recommend supportive cares.      ICD-10-CM    1. Suspected novel influenza A virus infection  R09.89       2. Throat pain  R07.0 Streptococcus A Rapid Screen w/Reflex to PCR - Clinic Collect     Group A Streptococcus PCR Throat Swab          Patient Instructions     1. Take Tylenol or Ibuprofen as needed for fever relief.   2. Increase fluids and rest.  3. Influenza is typically considered contagious one day prior and 5-7 days after your symptoms begin. I recommend returning to work/school after you are fever free for 24 hours. Continue to practice good hand hygiene and cough coverage well after you are fever free.   4. Follow up if you develop fever that can not be controlled, difficulty breathing, or severe dehydration.    Influenza  Influenza ( the flu ) is an infection that affects your respiratory tract (the mouth, nose, and lungs, and the passages between them). Unlike a cold, the flu can make you very ill. And it can lead to pneumonia, a serious lung infection. For some people, especially older adults, young children, and people with certain chronic conditions, the flu can have serious complications and even be fatal.  How Does the Flu Spread?  The flu is caused by viruses. The viruses spread through the air in droplets when someone who has the flu coughs, sneezes, laughs, or talks. You can become infected when you inhale these viruses directly. You can also become infected when you touch a surface on which the droplets have landed and then transfer the germs to your eyes, nose, or mouth. Touching used tissues, or sharing utensils, drinking glasses, or a toothbrush with an infected person can expose you to flu viruses,  too.  What Are the Symptoms of the Flu?  Flu symptoms tend to come on quickly and may last a few days to a few weeks. They include:    Fever usually higher than 101 F  (38.3 C) and chills    Sore throat and headache    Dry cough    Runny nose    Tiredness and weakness    Muscle aches  Factors That Can Make Flu Worse  For some people, the flu can be very serious. The risk of complications is greater for:    Children under age 5.    Adults 65 years of age and older.    People with a chronic illness, such as diabetes or heart, kidney, or lung disease.    People who live in a nursing home or long-term care facility.   How Is the Flu Treated?  Influenza usually improves after 7 days or so. In some cases, your health care provider may prescribe an antiviral medication. This may help you get well sooner. For the medication to help, you need to take it as soon as possible (ideally within 48 hours) after your symptoms start. If you develop pneumonia or other serious illness, hospital care may be needed.  Easing Flu Symptoms    Drink lots of fluids such as water, juice, and warm soup. A good rule is to drink enough so that you urinate your normal amount.    Get plenty of rest.    Ask your health care provider what to take for fever and pain.    Call your provider if your fever rises over 101 F (38.3 C) or you become dizzy, lightheaded, or short of breath.        HPI:  Macrina Gonzales is a 37 year old female who presents today complaining of sore throat, chills, and headache for the past 4 days.  Patient is also here with her child who has tested positive for influenza A.    History obtained from the patient.    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)  2022-07: Paresthesias  2021-12: Double vision  2021-12: Cerebrovascular accident (CVA) due to occlusion of right   vertebral artery (H)  2019-11:  Herpes zoster without complication  2019: Raynaud's disease without gangrene  2019: Vitamin D deficiency  2018: CAMELIA I (cervical intraepithelial neoplasia I)  2017: Abnormal mammogram  2017: Dense breast tissue on mammogram  2017: Fibrocystic breast changes  2017: Papanicolaou smear of cervix with low grade squamous   intraepithelial lesion (LGSIL)  2001: Crohn's disease of small intestine (H)      Past Medical History:   Diagnosis Date     Cerebral infarction (H) 2021    Right vertebral artery dissection       Social History     Tobacco Use     Smoking status: Former     Packs/day: 1.00     Years: 7.00     Pack years: 7.00     Types: Cigarettes     Start date: 2003     Quit date: 2010     Years since quittin.9     Smokeless tobacco: Never     Tobacco comments:     Smoked when I was younger, no longer smoke   Substance Use Topics     Alcohol use: Not Currently       Review of Systems    Vitals:    22 1646   BP: 122/86   BP Location: Right arm   Patient Position: Sitting   Cuff Size: Adult Regular   Pulse: 103   Resp: 16   Temp: 98.6  F (37  C)   TempSrc: Oral   SpO2: 99%   Weight: 51.3 kg (113 lb)       Physical Exam  Vitals and nursing note reviewed.   Constitutional:       General: She is not in acute distress.     Appearance: She is not toxic-appearing or diaphoretic.   HENT:      Head: Normocephalic and atraumatic.      Right Ear: External ear normal.      Left Ear: External ear normal.      Mouth/Throat:      Pharynx: No oropharyngeal exudate or posterior oropharyngeal erythema.   Eyes:      Conjunctiva/sclera: Conjunctivae normal.   Cardiovascular:      Rate and Rhythm: Normal rate and regular rhythm.      Heart sounds: No murmur heard.  Pulmonary:      Effort: Pulmonary effort is normal. No respiratory distress.      Breath sounds: No stridor. No wheezing, rhonchi or rales.   Lymphadenopathy:      Cervical: No cervical adenopathy.   Neurological:       Mental Status: She is alert.   Psychiatric:         Mood and Affect: Mood normal.         Behavior: Behavior normal.         Thought Content: Thought content normal.         Judgment: Judgment normal.         Results:  Results for orders placed or performed in visit on 12/12/22   Streptococcus A Rapid Screen w/Reflex to PCR - Clinic Collect     Status: Normal    Specimen: Throat; Swab   Result Value Ref Range    Group A Strep antigen Negative Negative         At the end of the encounter, I discussed results, diagnosis, medications. Discussed red flags for immediate return to clinic/ER, as well as indications for follow up if no improvement. Patient understood and agreed to plan. Patient was stable for discharge.

## 2022-12-18 ENCOUNTER — OFFICE VISIT (OUTPATIENT)
Dept: FAMILY MEDICINE | Facility: CLINIC | Age: 37
End: 2022-12-18
Payer: COMMERCIAL

## 2022-12-18 VITALS
WEIGHT: 113 LBS | RESPIRATION RATE: 16 BRPM | HEART RATE: 83 BPM | TEMPERATURE: 98.2 F | OXYGEN SATURATION: 97 % | DIASTOLIC BLOOD PRESSURE: 81 MMHG | BODY MASS INDEX: 21.18 KG/M2 | SYSTOLIC BLOOD PRESSURE: 119 MMHG

## 2022-12-18 DIAGNOSIS — H65.91 FLUID LEVEL BEHIND TYMPANIC MEMBRANE OF RIGHT EAR: Primary | ICD-10-CM

## 2022-12-18 PROCEDURE — 99213 OFFICE O/P EST LOW 20 MIN: CPT | Performed by: FAMILY MEDICINE

## 2022-12-18 NOTE — PROGRESS NOTES
Assessment:       Fluid level behind tympanic membrane of right ear      Plan:     Patient with a serous effusion of the right ear likely related to recent nasal congestion.  Recommend Sudafed.  Overall she seems to be improved from her recent influenza infection.  No antibiotics indicated at this.  Follow-up if symptoms getting worse or not improving in 1 week.        Subjective:       37 year old female presents for evaluation of a plugged and full right ear.  She has recently been getting over influenza A and on day 6 of illness.  She is continuing to feel rundown but overall has gotten better and her cough is improved and she no longer has a fever.  She still continues to feel congested however in for the past 2 days has had some fullness and plugged feeling in the right ear.  She feels some pressure and wants to make sure she does not have an ear infection.  Denies shortness of breath or wheezing.    Patient Active Problem List   Diagnosis     Abnormal mammogram     CAMELIA I (cervical intraepithelial neoplasia I)     Crohn's disease of small intestine (H)     Dense breast tissue on mammogram     Double vision     Fibrocystic breast changes     Herpes zoster without complication     Papanicolaou smear of cervix with low grade squamous intraepithelial lesion (LGSIL)     Raynaud's disease without gangrene     Cerebrovascular accident (CVA) due to occlusion of right vertebral artery (H)     Vitamin D deficiency     Abnormal CT of liver     Vertebral artery dissection (H)     Family history of malignant neoplasm of breast     Lung granuloma (H)     Paresthesias       Past Medical History:   Diagnosis Date     Cerebral infarction (H) December 23, 2021    Right vertebral artery dissection       Past Surgical History:   Procedure Laterality Date     COLONOSCOPY  Need one this summer    Crohn s disease     GI SURGERY  When I was 21    Iliectomy     ORTHOPEDIC SURGERY  When I was 18    Plate in right arm       Current  Outpatient Medications   Medication     bisacodyl (DULCOLAX) 5 MG EC tablet     clopidogrel (PLAVIX) 75 MG tablet     Omega-3 Fatty Acids (FISH OIL PO)     polyethylene glycol (GOLYTELY) 236 g suspension     Prenatal Vit-Fe Fumarate-FA (PRENATAL MULTIVITAMIN W/IRON) 27-0.8 MG tablet     vitamin C (ASCORBIC ACID) 100 MG tablet     VITAMIN D (CHOLECALCIFEROL) PO     No current facility-administered medications for this visit.       No Known Allergies    Family History   Problem Relation Age of Onset     Anxiety Disorder Mother      Obesity Mother      Diabetes Father      Hypertension Father      Obesity Father      Breast Cancer Maternal Grandmother      Breast Cancer Paternal Grandmother      Anxiety Disorder Brother      Breast Cancer Cousin      Colorectal Cancer No family hx of        Social History     Socioeconomic History     Marital status:      Spouse name: None     Number of children: None     Years of education: None     Highest education level: None   Tobacco Use     Smoking status: Former     Packs/day: 1.00     Years: 7.00     Pack years: 7.00     Types: Cigarettes     Start date: 2003     Quit date: 2010     Years since quittin.9     Smokeless tobacco: Never     Tobacco comments:     Smoked when I was younger, no longer smoke   Vaping Use     Vaping Use: Never used   Substance and Sexual Activity     Alcohol use: Not Currently     Drug use: Yes     Types: Marijuana     Comment: Take low doses of marijuana for stroke symptoms     Sexual activity: Yes     Partners: Male     Birth control/protection: Pull-out method, Natural Family Planning   Other Topics Concern     Parent/sibling w/ CABG, MI or angioplasty before 65F 55M? No         Review of Systems  Pertinent items are noted in HPI.      Objective:                 General Appearance:    /81   Pulse 83   Temp 98.2  F (36.8  C)   Resp 16   Wt 51.3 kg (113 lb)   LMP 2022 (Exact Date)   SpO2 97%   BMI 21.18 kg/m           Alert, pleasant, cooperative, no distress, appears stated age   Head:    Normocephalic, without obvious abnormality, atraumatic   Eyes:    Conjunctiva/corneas clear   Ears:   Right serous middle ear effusion noted.  No erythema of the tympanic membrane.  No effusion on the left.  She does have some scarring noted from previous tympanostomy tubes bilaterally.  Ear canals normal bilaterally.   Nose:   Nares normal, septum midline, mucosa normal, no drainage    or sinus tenderness   Throat:   Lips, mucosa, and tongue normal; teeth and gums normal.  No tonsilar hypertrophy or exudate.   Neck:   Supple, symmetrical, trachea midline, no adenopathy    Lungs:     Clear to auscultation bilaterally without wheezes, rales, or rhonchi, respirations unlabored    Heart:    Regular rate and rhythm, S1 and S2 normal, no murmur, rub or gallop       Extremities:   Extremities normal, atraumatic, no cyanosis or edema   Skin:   Skin color, texture, turgor normal, no rashes or lesions         This note has been dictated using voice recognition software. Any grammatical or context distortions are unintentional and inherent to the software

## 2023-01-03 ENCOUNTER — HOSPITAL ENCOUNTER (OUTPATIENT)
Dept: CT IMAGING | Facility: CLINIC | Age: 38
Discharge: HOME | End: 2023-01-03
Attending: PSYCHIATRY & NEUROLOGY | Admitting: PSYCHIATRY & NEUROLOGY
Payer: COMMERCIAL

## 2023-01-03 DIAGNOSIS — I63.211 CEREBROVASCULAR ACCIDENT (CVA) DUE TO OCCLUSION OF RIGHT VERTEBRAL ARTERY (H): ICD-10-CM

## 2023-01-03 DIAGNOSIS — I77.74 VERTEBRAL ARTERY DISSECTION (H): ICD-10-CM

## 2023-01-03 PROCEDURE — 70496 CT ANGIOGRAPHY HEAD: CPT

## 2023-01-03 PROCEDURE — 250N000011 HC RX IP 250 OP 636: Performed by: PSYCHIATRY & NEUROLOGY

## 2023-01-03 PROCEDURE — 70498 CT ANGIOGRAPHY NECK: CPT

## 2023-01-03 RX ORDER — IOPAMIDOL 755 MG/ML
75 INJECTION, SOLUTION INTRAVASCULAR ONCE
Status: COMPLETED | OUTPATIENT
Start: 2023-01-03 | End: 2023-01-03

## 2023-01-03 RX ADMIN — IOPAMIDOL 75 ML: 755 INJECTION, SOLUTION INTRAVENOUS at 07:51

## 2023-02-14 ENCOUNTER — TELEPHONE (OUTPATIENT)
Dept: GASTROENTEROLOGY | Facility: CLINIC | Age: 38
End: 2023-02-14

## 2023-02-14 DIAGNOSIS — K50.00 CROHN'S DISEASE OF SMALL INTESTINE WITHOUT COMPLICATION (H): Primary | ICD-10-CM

## 2023-02-14 NOTE — TELEPHONE ENCOUNTER
Attempted to contact patient regarding upcoming Colonoscopy  procedure on 2/28/2023 for pre assessment questions. No answer.     Left message to return call to [phone] #[phone]    Discuss Covid policy and designated  policy.    Nadya Champagne RN  Endoscopy Procedure Pre Assessment RN

## 2023-02-14 NOTE — TELEPHONE ENCOUNTER
Patient scheduled for Colonoscopy  on 2/28/2023.     Discuss Covid policy.     Pre op exam scheduled: N/A    Arrival time: 0800. Procedure time 0900    Facility location: [facility]; [address] [place] [address], [address], [place] [address]    Sedation type: MAC    Anticoagulations? Yes Clopidogrel (Plavix). Holding interval of 5 days    Electronic implanted devices? No    Diabetic? No    Indication for procedure: Crohn's disease of small intestine without complication (H)    Bowel prep recommendation: Standard Golytely     Prep instructions sent via [employer] Bowel prep script sent to    [facility]/PHARMACY #[id number] - [place], [place] - [address] AT [facility]. [facility] [address] & [place]    Pre visit planning completed.    Nadya Champagne RN  Endoscopy Procedure Pre Assessment RN

## 2023-02-21 RX ORDER — BISACODYL 5 MG/1
TABLET, DELAYED RELEASE ORAL
Qty: 4 TABLET | Refills: 0 | Status: SHIPPED | OUTPATIENT
Start: 2023-02-21 | End: 2023-03-08

## 2023-02-21 NOTE — TELEPHONE ENCOUNTER
Second attempt for pre-assessment prior to upcoming colonoscopy on 2.28.23     No answer.  Left message to return call [phone] #4    MyChart message sent    Eleonora Prieto RN  Endoscopy Procedure Pre Assessment RN

## 2023-02-24 NOTE — TELEPHONE ENCOUNTER
Pre assessment questions completed for upcoming Colonoscopy  procedure scheduled on 2/28/23    COVID policy reviewed.     Pre-op exam? N/A    Reviewed procedural arrival time 0800, procedure time 0900 and facility location [facility]; [address] [place] [address], [address], [place] [address]    Designated  policy reviewed. Instructed to have someone stay 24 hours post procedure.     Anticoagulation/blood thinners? No patient no longer take plavix, discontinued early January 2023. Verified patient did not replace plavix with different medication.     Electronic implanted devices? No    Diabetic? No    Procedure indication: Crohns disease of small intestine without complication    Bowel prep recommendation: Standard Golytely     Reviewed procedure prep instructions.     Prep instructions sent via [employer].    Patient verbalized understanding and had no questions or concerns at this time.    Vicki Quinetro RN  Endoscopy Procedure Pre Assessment RN

## 2023-02-24 NOTE — TELEPHONE ENCOUNTER
Third attempt for pre-assessment prior to upcoming colonoscopy on 2.28.23     No answer.  Left message to return call [phone] #4    MyChart message sent    Patient had been on Plavix however unclear if remains on this medication, as was to be determined need for continuation after head/neck CT which was completed on 1.3.23.     Eleonora Prieto RN  Endoscopy Procedure Pre Assessment RN

## 2023-02-28 ENCOUNTER — HOSPITAL ENCOUNTER (OUTPATIENT)
Facility: AMBULATORY SURGERY CENTER | Age: 38
Discharge: HOME | End: 2023-02-28
Attending: INTERNAL MEDICINE | Admitting: INTERNAL MEDICINE
Payer: COMMERCIAL

## 2023-02-28 ENCOUNTER — ANESTHESIA EVENT (OUTPATIENT)
Dept: SURGERY | Facility: AMBULATORY SURGERY CENTER | Age: 38
End: 2023-02-28
Payer: COMMERCIAL

## 2023-02-28 ENCOUNTER — ANESTHESIA (OUTPATIENT)
Dept: SURGERY | Facility: AMBULATORY SURGERY CENTER | Age: 38
End: 2023-02-28
Payer: COMMERCIAL

## 2023-02-28 VITALS
RESPIRATION RATE: 16 BRPM | BODY MASS INDEX: 21.34 KG/M2 | HEART RATE: 51 BPM | TEMPERATURE: 97.8 F | SYSTOLIC BLOOD PRESSURE: 100 MMHG | OXYGEN SATURATION: 99 % | HEIGHT: 61 IN | WEIGHT: 113 LBS | DIASTOLIC BLOOD PRESSURE: 64 MMHG

## 2023-02-28 VITALS — HEART RATE: 71 BPM

## 2023-02-28 LAB
COLONOSCOPY: NORMAL
HCG UR QL: NEGATIVE
INTERNAL QC OK POCT: NORMAL
POCT KIT EXPIRATION DATE: NORMAL
POCT KIT LOT NUMBER: NORMAL

## 2023-02-28 PROCEDURE — 45380 COLONOSCOPY AND BIOPSY: CPT

## 2023-02-28 PROCEDURE — 81025 URINE PREGNANCY TEST: CPT | Performed by: PATHOLOGY

## 2023-02-28 PROCEDURE — 88305 TISSUE EXAM BY PATHOLOGIST: CPT | Mod: 26 | Performed by: PATHOLOGY

## 2023-02-28 PROCEDURE — 88305 TISSUE EXAM BY PATHOLOGIST: CPT | Mod: TC | Performed by: INTERNAL MEDICINE

## 2023-02-28 RX ORDER — SODIUM CHLORIDE, SODIUM LACTATE, POTASSIUM CHLORIDE, CALCIUM CHLORIDE 600; 310; 30; 20 MG/100ML; MG/100ML; MG/100ML; MG/100ML
INJECTION, SOLUTION INTRAVENOUS CONTINUOUS
Status: DISCONTINUED | OUTPATIENT
Start: 2023-02-28 | End: 2023-03-01 | Stop reason: HOSPADM

## 2023-02-28 RX ORDER — PROPOFOL 10 MG/ML
INJECTION, EMULSION INTRAVENOUS CONTINUOUS PRN
Status: DISCONTINUED | OUTPATIENT
Start: 2023-02-28 | End: 2023-02-28

## 2023-02-28 RX ORDER — PROCHLORPERAZINE MALEATE 10 MG
10 TABLET ORAL EVERY 6 HOURS PRN
Status: DISCONTINUED | OUTPATIENT
Start: 2023-02-28 | End: 2023-03-01 | Stop reason: HOSPADM

## 2023-02-28 RX ORDER — PROPOFOL 10 MG/ML
INJECTION, EMULSION INTRAVENOUS PRN
Status: DISCONTINUED | OUTPATIENT
Start: 2023-02-28 | End: 2023-02-28

## 2023-02-28 RX ORDER — NALOXONE HYDROCHLORIDE 0.4 MG/ML
0.2 INJECTION, SOLUTION INTRAMUSCULAR; INTRAVENOUS; SUBCUTANEOUS
Status: DISCONTINUED | OUTPATIENT
Start: 2023-02-28 | End: 2023-03-01 | Stop reason: HOSPADM

## 2023-02-28 RX ORDER — LIDOCAINE HYDROCHLORIDE 20 MG/ML
INJECTION, SOLUTION INFILTRATION; PERINEURAL PRN
Status: DISCONTINUED | OUTPATIENT
Start: 2023-02-28 | End: 2023-02-28

## 2023-02-28 RX ORDER — ONDANSETRON 4 MG/1
4 TABLET, ORALLY DISINTEGRATING ORAL EVERY 6 HOURS PRN
Status: DISCONTINUED | OUTPATIENT
Start: 2023-02-28 | End: 2023-03-01 | Stop reason: HOSPADM

## 2023-02-28 RX ORDER — FLUMAZENIL 0.1 MG/ML
0.2 INJECTION, SOLUTION INTRAVENOUS
Status: ACTIVE | OUTPATIENT
Start: 2023-02-28 | End: 2023-02-28

## 2023-02-28 RX ORDER — ONDANSETRON 2 MG/ML
4 INJECTION INTRAMUSCULAR; INTRAVENOUS
Status: DISCONTINUED | OUTPATIENT
Start: 2023-02-28 | End: 2023-02-28 | Stop reason: HOSPADM

## 2023-02-28 RX ORDER — SODIUM CHLORIDE, SODIUM LACTATE, POTASSIUM CHLORIDE, CALCIUM CHLORIDE 600; 310; 30; 20 MG/100ML; MG/100ML; MG/100ML; MG/100ML
INJECTION, SOLUTION INTRAVENOUS CONTINUOUS PRN
Status: DISCONTINUED | OUTPATIENT
Start: 2023-02-28 | End: 2023-02-28

## 2023-02-28 RX ORDER — NALOXONE HYDROCHLORIDE 0.4 MG/ML
0.4 INJECTION, SOLUTION INTRAMUSCULAR; INTRAVENOUS; SUBCUTANEOUS
Status: DISCONTINUED | OUTPATIENT
Start: 2023-02-28 | End: 2023-03-01 | Stop reason: HOSPADM

## 2023-02-28 RX ORDER — LIDOCAINE 40 MG/G
CREAM TOPICAL
Status: DISCONTINUED | OUTPATIENT
Start: 2023-02-28 | End: 2023-02-28 | Stop reason: HOSPADM

## 2023-02-28 RX ORDER — ONDANSETRON 2 MG/ML
4 INJECTION INTRAMUSCULAR; INTRAVENOUS EVERY 6 HOURS PRN
Status: DISCONTINUED | OUTPATIENT
Start: 2023-02-28 | End: 2023-03-01 | Stop reason: HOSPADM

## 2023-02-28 RX ADMIN — SODIUM CHLORIDE, SODIUM LACTATE, POTASSIUM CHLORIDE, CALCIUM CHLORIDE: 600; 310; 30; 20 INJECTION, SOLUTION INTRAVENOUS at 08:23

## 2023-02-28 RX ADMIN — PROPOFOL 200 MCG/KG/MIN: 10 INJECTION, EMULSION INTRAVENOUS at 09:08

## 2023-02-28 RX ADMIN — LIDOCAINE HYDROCHLORIDE 50 MG: 20 INJECTION, SOLUTION INFILTRATION; PERINEURAL at 09:08

## 2023-02-28 RX ADMIN — PROPOFOL 80 MG: 10 INJECTION, EMULSION INTRAVENOUS at 09:08

## 2023-02-28 NOTE — ANESTHESIA PREPROCEDURE EVALUATION
Anesthesia Pre-Procedure Evaluation    Patient: Macrina Gonzales   MRN: [id number] : 1985        Procedure : Procedure(s):  COLONOSCOPY          Past Medical History:   Diagnosis Date     Cerebral infarction (H) 2021    Right vertebral artery dissection      Past Surgical History:   Procedure Laterality Date     COLONOSCOPY  Need one this summer    Crohn s disease     GI SURGERY  When I was 21    Iliectomy     ORTHOPEDIC SURGERY  When I was 18    Plate in right arm      No Known Allergies   Social History     Tobacco Use     Smoking status: Former     Packs/day: 1.00     Years: 7.00     Pack years: 7.00     Types: Cigarettes     Start date: 2003     Quit date: 2010     Years since quittin.1     Smokeless tobacco: Never     Tobacco comments:     Smoked when I was younger, no longer smoke   Substance Use Topics     Alcohol use: Not Currently      Wt Readings from Last 1 Encounters:   23 51.3 kg (113 lb)        Anesthesia Evaluation            ROS/MED HX  ENT/Pulmonary:       Neurologic: Comment: Vertebral Artery Dissection    (+) CVA, TIA,     Cardiovascular:       METS/Exercise Tolerance:     Hematologic:       Musculoskeletal:       GI/Hepatic:     (+) Inflammatory bowel disease,     Renal/Genitourinary:       Endo:       Psychiatric/Substance Use:       Infectious Disease:       Malignancy:       Other: Comment: Raynauds           Physical Exam    Airway  airway exam normal      Mallampati: II   TM distance: > 3 FB   Neck ROM: full   Mouth opening: > 3 cm    Respiratory Devices and Support         Dental    unable to assess        Cardiovascular          Rhythm and rate: regular and normal     Pulmonary   pulmonary exam normal        breath sounds clear to auscultation           OUTSIDE LABS:  CBC:   Lab Results   Component Value Date    WBC 7.9 2022    WBC 6.5 2022    HGB 13.8 2022    HGB 13.1 2022    HCT 42.4 2022    HCT 40.7 2022    PLT  281 09/22/2022     07/11/2022     BMP:   Lab Results   Component Value Date     09/22/2022     07/11/2022    POTASSIUM 4.1 09/22/2022    POTASSIUM 4.1 07/11/2022    CHLORIDE 103 09/22/2022    CHLORIDE 104 07/11/2022    CO2 25 09/22/2022    CO2 29 07/11/2022    BUN 14 09/22/2022    BUN 11.2 07/11/2022    CR 0.79 09/22/2022    CR 0.74 07/11/2022    GLC 88 09/22/2022    GLC 87 07/11/2022     COAGS: No results found for: PTT, INR, FIBR  POC: No results found for: BGM, HCG, HCGS  HEPATIC:   Lab Results   Component Value Date    ALBUMIN 4.0 09/22/2022    PROTTOTAL 7.6 09/22/2022    ALT 14 09/22/2022    AST 18 09/22/2022    ALKPHOS 67 09/22/2022    BILITOTAL 0.5 09/22/2022     OTHER:   Lab Results   Component Value Date    JEANNETTE 9.4 09/22/2022    TSH 0.97 09/22/2022    CRP <0.1 09/22/2022    SED 8 09/22/2022       Anesthesia Plan    ASA Status:  2   NPO Status:  NPO Appropriate    Anesthesia Type: MAC.     - Reason for MAC: immobility needed, straight local not clinically adequate   Induction: Intravenous.   Maintenance: TIVA.        Consents    Anesthesia Plan(s) and associated risks, benefits, and realistic alternatives discussed. Questions answered and patient/representative(s) expressed understanding.    - Discussed:     - Discussed with:  Patient      - Extended Intubation/Ventilatory Support Discussed: No.      - Patient is DNR/DNI Status: No    Use of blood products discussed: No .     Postoperative Care    Pain management: IV analgesics, Oral pain medications, Multi-modal analgesia.   PONV prophylaxis: Ondansetron (or other 5HT-3), Background Propofol Infusion     Comments:                Eugene Kent MD

## 2023-02-28 NOTE — ANESTHESIA CARE TRANSFER NOTE
Patient: Macrina Gonzales    Procedure: Procedure(s):  COLONOSCOPY, WITH BIOPSY       Diagnosis: Crohn's disease of small intestine without complication (H) [K50.00]  Diagnosis Additional Information: No value filed.    Anesthesia Type:   MAC     Note:    Oropharynx: oropharynx clear of all foreign objects and spontaneously breathing  Level of Consciousness: awake  Oxygen Supplementation: room air    Independent Airway: airway patency satisfactory and stable  Dentition: dentition unchanged  Vital Signs Stable: post-procedure vital signs reviewed and stable  Report to RN Given: handoff report given  Patient transferred to: Phase II  Comments: Uneventful transport   Report to RN - Stephanie  Pt comfortable  Exchanging well; color natl  Pt responds appropriately to command  IV patent  Lips/teeth/dentition as preop status  Questions answered    Handoff Report: Identifed the Patient, Identified the Reponsible Provider, Reviewed the pertinent medical history, Discussed the surgical course, Reviewed Intra-OP anesthesia mangement and issues during anesthesia, Set expectations for post-procedure period and Allowed opportunity for questions and acknowledgement of understanding      Vitals:  Vitals Value Taken Time   BP 83/50 02/28/23 0950   Temp 36.6  C (97.8  F) 02/28/23 0950   Pulse 61 02/28/23 0950   Resp 14 02/28/23 0950   SpO2 100 % 02/28/23 0950       Electronically Signed By: ASHU BAKER CRNA  February 28, 2023  9:53 AM

## 2023-02-28 NOTE — H&P
Macrina Gonzales  [id number]  female  38 year old      Reason for procedure/surgery: Crohn's Disease activity assessment    Patient Active Problem List   Diagnosis     Abnormal mammogram     CAMELIA I (cervical intraepithelial neoplasia I)     Crohn's disease of small intestine (H)     Dense breast tissue on mammogram     Double vision     Fibrocystic breast changes     Herpes zoster without complication     Papanicolaou smear of cervix with low grade squamous intraepithelial lesion (LGSIL)     Raynaud's disease without gangrene     Cerebrovascular accident (CVA) due to occlusion of right vertebral artery (H)     Vitamin D deficiency     Abnormal CT of liver     Vertebral artery dissection (H)     Family history of malignant neoplasm of breast     Lung granuloma (H)     Paresthesias       Past Surgical History:    Past Surgical History:   Procedure Laterality Date     COLONOSCOPY  Need one this summer    Crohn s disease     GI SURGERY  When I was 21    Iliectomy     ORTHOPEDIC SURGERY  When I was 18    Plate in right arm       Past Medical History:   Past Medical History:   Diagnosis Date     Cerebral infarction (H) 2021    Right vertebral artery dissection       Social History:   Social History     Tobacco Use     Smoking status: Former     Packs/day: 1.00     Years: 7.00     Pack years: 7.00     Types: Cigarettes     Start date: 2003     Quit date: 2010     Years since quittin.1     Smokeless tobacco: Never     Tobacco comments:     Smoked when I was younger, no longer smoke   Substance Use Topics     Alcohol use: Not Currently       Family History:   Family History   Problem Relation Age of Onset     Anxiety Disorder Mother      Obesity Mother      Diabetes Father      Hypertension Father      Obesity Father      Breast Cancer Maternal Grandmother      Breast Cancer Paternal Grandmother      Anxiety Disorder Brother      Breast Cancer Cousin      Colorectal Cancer No family hx of         Allergies: No Known Allergies    Active Medications:   Current Outpatient Medications   Medication Sig Dispense Refill     bisacodyl (DULCOLAX) 5 MG EC tablet Take 2 tablets at 3 pm the day before your procedure. If your procedure is before 11 am, take 2 additional tablets at 11 pm. If your procedure is after 11 am, take 2 additional tablets at 6 am. For additional instructions refer to your colonoscopy prep instructions. 4 tablet 0     polyethylene glycol (GOLYTELY) 236 g suspension The night before the exam at 6 pm drink an 8-ounce glass every 15 minutes until the jug is half empty. If you arrive before 11 AM: Drink the other half of the Golytely jug at 11 PM night before procedure. If you arrive after 11 AM: Drink the other half of the Golytely jug at 6 AM day of procedure. For additional instructions refer to your colonoscopy prep instructions. 4000 mL 0     VITAMIN D (CHOLECALCIFEROL) PO        bisacodyl (DULCOLAX) 5 MG EC tablet Take 2 tablets at 3 pm the day before your procedure. If your procedure is before 11 am, take 2 additional tablets at 11 pm. If your procedure is after 11 am, take 2 additional tablets at 6 am. For additional instructions refer to your colonoscopy prep instructions. 4 tablet 0     polyethylene glycol (GOLYTELY) 236 g suspension The night before the exam at 6 pm drink an 8-ounce glass every 15 minutes until the jug is half empty. If you arrive before 11 AM: Drink the other half of the Golytely jug at 11 PM night before procedure. If you arrive after 11 AM: Drink the other half of the Golytely jug at 6 AM day of procedure. For additional instructions refer to your colonoscopy prep instructions. 4000 mL 0       Systemic Review:   CONSTITUTIONAL: NEGATIVE for fever, chills, change in weight  ENT/MOUTH: NEGATIVE for ear, mouth and throat problems  RESP: NEGATIVE for significant cough or SOB  CV: NEGATIVE for chest pain, palpitations or peripheral edema    Physical Examination:   Vital  "Signs: /76 (BP Location: Right arm)   Pulse 69   Temp 98  F (36.7  C) (Temporal)   Resp 16   Ht 1.556 m (5' 1.25\")   Wt 51.3 kg (113 lb)   SpO2 98%   BMI 21.18 kg/m    GENERAL: healthy, alert and no distress  NECK: no adenopathy, no asymmetry, masses, or scars  RESP: lungs clear to auscultation - no rales, rhonchi or wheezes  CV: regular rate and rhythm, normal S1 S2, no S3 or S4, no murmur, click or rub, no peripheral edema and peripheral pulses strong  ABDOMEN: soft, nontender, no hepatosplenomegaly, no masses and bowel sounds normal  MS: no gross musculoskeletal defects noted, no edema    Plan: Appropriate to proceed as scheduled.      Gumaro Wood MD  2/28/2023    PCP:  Maritza Jerry    "

## 2023-03-02 LAB
PATH REPORT.COMMENTS IMP SPEC: NORMAL
PATH REPORT.FINAL DX SPEC: NORMAL
PATH REPORT.GROSS SPEC: NORMAL
PATH REPORT.MICROSCOPIC SPEC OTHER STN: NORMAL
PATH REPORT.RELEVANT HX SPEC: NORMAL
PHOTO IMAGE: NORMAL

## 2023-03-08 ENCOUNTER — LAB (OUTPATIENT)
Dept: LAB | Facility: CLINIC | Age: 38
End: 2023-03-08
Payer: COMMERCIAL

## 2023-03-08 ENCOUNTER — TELEPHONE (OUTPATIENT)
Dept: GASTROENTEROLOGY | Facility: CLINIC | Age: 38
End: 2023-03-08

## 2023-03-08 ENCOUNTER — OFFICE VISIT (OUTPATIENT)
Dept: GASTROENTEROLOGY | Facility: CLINIC | Age: 38
End: 2023-03-08
Payer: COMMERCIAL

## 2023-03-08 ENCOUNTER — OFFICE VISIT (OUTPATIENT)
Dept: FAMILY MEDICINE | Facility: CLINIC | Age: 38
End: 2023-03-08
Payer: COMMERCIAL

## 2023-03-08 VITALS
OXYGEN SATURATION: 100 % | SYSTOLIC BLOOD PRESSURE: 121 MMHG | DIASTOLIC BLOOD PRESSURE: 79 MMHG | RESPIRATION RATE: 16 BRPM | TEMPERATURE: 99.3 F | HEART RATE: 95 BPM

## 2023-03-08 VITALS
SYSTOLIC BLOOD PRESSURE: 111 MMHG | HEIGHT: 61 IN | OXYGEN SATURATION: 100 % | DIASTOLIC BLOOD PRESSURE: 76 MMHG | WEIGHT: 117.1 LBS | BODY MASS INDEX: 22.11 KG/M2 | HEART RATE: 92 BPM

## 2023-03-08 DIAGNOSIS — K50.00 CROHN'S DISEASE OF SMALL INTESTINE WITHOUT COMPLICATION (H): Primary | ICD-10-CM

## 2023-03-08 DIAGNOSIS — K50.90 CROHN'S DISEASE WITHOUT COMPLICATION, UNSPECIFIED GASTROINTESTINAL TRACT LOCATION (H): ICD-10-CM

## 2023-03-08 DIAGNOSIS — K50.00 CROHN'S DISEASE OF SMALL INTESTINE WITHOUT COMPLICATION (H): ICD-10-CM

## 2023-03-08 DIAGNOSIS — J02.0 STREP PHARYNGITIS: Primary | ICD-10-CM

## 2023-03-08 LAB
ALBUMIN SERPL BCG-MCNC: 4.3 G/DL (ref 3.5–5.2)
ALP SERPL-CCNC: 70 U/L (ref 35–104)
ALT SERPL W P-5'-P-CCNC: 13 U/L (ref 10–35)
ANION GAP SERPL CALCULATED.3IONS-SCNC: 10 MMOL/L (ref 7–15)
AST SERPL W P-5'-P-CCNC: 16 U/L (ref 10–35)
BASOPHILS # BLD AUTO: 0 10E3/UL (ref 0–0.2)
BASOPHILS NFR BLD AUTO: 0 %
BILIRUB DIRECT SERPL-MCNC: <0.2 MG/DL (ref 0–0.3)
BILIRUB SERPL-MCNC: 0.4 MG/DL
BUN SERPL-MCNC: 12.2 MG/DL (ref 6–20)
CALCIUM SERPL-MCNC: 9.7 MG/DL (ref 8.6–10)
CHLORIDE SERPL-SCNC: 102 MMOL/L (ref 98–107)
CREAT SERPL-MCNC: 0.8 MG/DL (ref 0.51–0.95)
CRP SERPL-MCNC: 7.66 MG/L
DEPRECATED HCO3 PLAS-SCNC: 27 MMOL/L (ref 22–29)
DEPRECATED S PYO AG THROAT QL EIA: POSITIVE
EOSINOPHIL # BLD AUTO: 0 10E3/UL (ref 0–0.7)
EOSINOPHIL NFR BLD AUTO: 0 %
ERYTHROCYTE [DISTWIDTH] IN BLOOD BY AUTOMATED COUNT: 13.2 % (ref 10–15)
ERYTHROCYTE [SEDIMENTATION RATE] IN BLOOD BY WESTERGREN METHOD: 12 MM/HR (ref 0–20)
GFR SERPL CREATININE-BSD FRML MDRD: >90 ML/MIN/1.73M2
GLUCOSE SERPL-MCNC: 86 MG/DL (ref 70–99)
HCT VFR BLD AUTO: 42.4 % (ref 35–47)
HGB BLD-MCNC: 13.8 G/DL (ref 11.7–15.7)
IMM GRANULOCYTES # BLD: 0 10E3/UL
IMM GRANULOCYTES NFR BLD: 0 %
LYMPHOCYTES # BLD AUTO: 1.7 10E3/UL (ref 0.8–5.3)
LYMPHOCYTES NFR BLD AUTO: 15 %
MCH RBC QN AUTO: 29.8 PG (ref 26.5–33)
MCHC RBC AUTO-ENTMCNC: 32.5 G/DL (ref 31.5–36.5)
MCV RBC AUTO: 92 FL (ref 78–100)
MONOCYTES # BLD AUTO: 0.8 10E3/UL (ref 0–1.3)
MONOCYTES NFR BLD AUTO: 7 %
NEUTROPHILS # BLD AUTO: 8.7 10E3/UL (ref 1.6–8.3)
NEUTROPHILS NFR BLD AUTO: 78 %
NRBC # BLD AUTO: 0 10E3/UL
NRBC BLD AUTO-RTO: 0 /100
PLATELET # BLD AUTO: 241 10E3/UL (ref 150–450)
POTASSIUM SERPL-SCNC: 4.5 MMOL/L (ref 3.4–5.3)
PROT SERPL-MCNC: 7.5 G/DL (ref 6.4–8.3)
RBC # BLD AUTO: 4.63 10E6/UL (ref 3.8–5.2)
SODIUM SERPL-SCNC: 139 MMOL/L (ref 136–145)
WBC # BLD AUTO: 11.2 10E3/UL (ref 4–11)

## 2023-03-08 PROCEDURE — 36415 COLL VENOUS BLD VENIPUNCTURE: CPT | Performed by: PATHOLOGY

## 2023-03-08 PROCEDURE — 85652 RBC SED RATE AUTOMATED: CPT | Performed by: PATHOLOGY

## 2023-03-08 PROCEDURE — 86140 C-REACTIVE PROTEIN: CPT | Performed by: PATHOLOGY

## 2023-03-08 PROCEDURE — 82248 BILIRUBIN DIRECT: CPT | Performed by: PATHOLOGY

## 2023-03-08 PROCEDURE — 85025 COMPLETE CBC W/AUTO DIFF WBC: CPT | Performed by: PATHOLOGY

## 2023-03-08 PROCEDURE — 99213 OFFICE O/P EST LOW 20 MIN: CPT | Performed by: PHYSICIAN ASSISTANT

## 2023-03-08 PROCEDURE — 80053 COMPREHEN METABOLIC PANEL: CPT | Performed by: PATHOLOGY

## 2023-03-08 PROCEDURE — 87880 STREP A ASSAY W/OPTIC: CPT | Performed by: PHYSICIAN ASSISTANT

## 2023-03-08 PROCEDURE — 99215 OFFICE O/P EST HI 40 MIN: CPT | Performed by: INTERNAL MEDICINE

## 2023-03-08 RX ORDER — AMOXICILLIN 500 MG/1
500 CAPSULE ORAL 2 TIMES DAILY
Qty: 20 CAPSULE | Refills: 0 | Status: SHIPPED | OUTPATIENT
Start: 2023-03-08 | End: 2023-03-18

## 2023-03-08 ASSESSMENT — PAIN SCALES - GENERAL: PAINLEVEL: NO PAIN (0)

## 2023-03-08 NOTE — NURSING NOTE
"Chief Complaint   Patient presents with     Follow Up       Vitals:    03/08/23 0736   BP: 111/76   BP Location: Left arm   Patient Position: Sitting   Cuff Size: Adult Regular   Pulse: 92   SpO2: 100%   Weight: 53.1 kg (117 lb 1.6 oz)   Height: 1.549 m (5' 1\")       Body mass index is 22.13 kg/m .    [employer]        "

## 2023-03-08 NOTE — PATIENT INSTRUCTIONS
It is good to see you today. Keep up the good work - things are looking good!    Get labs today    Schedule MRI    Repeat MRI in 1 year    Repeat colonoscopy in 2 years (unless symptoms return sooner)    Follow up with Dunia Arcos in 6 months and Dr. Wood in 1 year

## 2023-03-08 NOTE — TELEPHONE ENCOUNTER
----- Message from Gumaro Wood MD sent at 3/8/2023  2:53 PM CST -----  Regarding: Repeat CBC, LFTs, BMP and ESR, CRP in 2 months  Hi Eliu,    Labs a little off today.    Repeat above labs in 2 months. Please help arrange.    Also, help make sure pt gets MRE (she forgot about it last time)    Gumaro

## 2023-03-08 NOTE — TELEPHONE ENCOUNTER
MRE scheduled for 3/22. Lab orders placed. [facility] message sent with instructions to complete labs early May. Will set reminder to send additional message closer to May if necessary.

## 2023-03-08 NOTE — LETTER
3/8/2023         RE: Macrina Gonzales  [address]  [place] [address]        Dear Colleague,    Thank you for referring your patient, Macrina Gonzales, to the [facility] [place]. Please see a copy of my visit note below.    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 Has done very well since off therapy. 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 At this time we discussed monitoring off therapy vs restarting biologic (adalimumab vs other). Pt desires to avoid meds if able. I think this is reasonable given how well she has done. Will check labs and MRE now. If all well will monitor off therapy.    -continue to monitor off therapy  -check labs toady  -check MRE  -repeat MRE and labs/fecal calpro in 1 year  -colonoscopy in 2 years  -she will notify us if she has recurrent symptoms sooner and will discuss sooner re-evaluation vs starting therapy sooner      IBD HISTORY  Age at diagnosis: 2002  Extent of disease: ileal  Disease phenotype: stricturing  Amanda-anal disease: none  Current CD medications: none  Prior IBD surgeries: ileal resection with IC anastomosis  Prior IBD Medications:  -prednisone - remote courses  -6-MP did not control disease  -infliximab - did well - switched for insurance issues - switched to adalimumab (also easier to use - no infusions)  -adalimumab - started 2016 and continued until CVA 11/2021    DRUG MONITORING  TPMT enzyme activity: 32 (normal)    6-TGN/6-MMPN levels: NA    Biologic concentration: unknown    DISEASE ASSESSMENT  Labs  Recent Labs   Lab Test 09/22/22  1253 09/22/22  1155   CRP <0.1  --    SED  --  8     Fecal calprotectin: 10.9 9/2022  Endoscopy: Colonoscopy 3/2023 - No significant  inflammation - 2-3 small aphthous ulcers in yg-terminal ileum/anastomosis - rutgeert's i2  Enterography: pending  C diff: none    sIBDQ:   IBDQ Score Date IBDQ - Total Score  (Higher score better)   3/1/2023 65   9/20/2022 60       IBD Health Care Maintenance:    Vaccinations:  All patients on biologics should avoid live vaccines.    -- Influenza (every year)  -- TdaP (every 10 years)  -- Pneumococcal Pneumonia (once plus booster at 5 years)  -- Yearly assessment for latent Tb (verbal screening and exam, PPD or QuantiFERON-Tb testing)    One time confirmation of immunity or serologies:  -- Hepatitis A (serologies or immunizations)  -- Hepatitis B (serologies or immunizations)  -- Varicella  -- MMR  -- HPV (all aged 18-26)  -- Meningococcal meningitis (all patients at risk for meningitis)  -- ok for live vaccines      Bone mineral density screening   -- Recommend all patients supplement with calcium and vitamin D  -- Given prior steroid use recommend DEXA if not already done - DISCUSS AT NEXT VISIT    Cancer Screening:  Colon cancer screening:  Given ileal disease, DO NOT recommend patient undergo regular dysplasia surveillance   Next dysplasia screening is recommended NOT NEEDED.    Cervical cancer screening: Per OBGYN    Skin cancer screening: Annual visual exam of skin by dermatologist since patient is immunocompromised    Depression Screening:  -- Over the last month, have you felt down, depressed, or hopeless? no  -- Over the last month, have you felt little interest or pleasure doing things? no    Misc:  -- Avoid tobacco use  -- Avoid NSAIDs as there is potentially a 25% chance of causing an IBD flare    Return to clinic in 6 months with Dunia Arcos and 12 months with [place]    Thank you for this consultation.  It was a pleasure to participate in the care of this patient; please contact us with any further questions.  I spent a total of 40 minutes during the day of encounter performed chart review, meeting  with patient, patient counseling, care coordination, and documentation.      This note was created with voice recognition software, and while reviewed for accuracy, typos may remain.     Gumaro Wood MD  Division of Gastroenterology, Hepatology and Nutrition  [facility]  Pager: [phone]      HPI:   Currently, here for follow up after colonoscopy last week.    She is doing great! Having no symptoms of CD. BMs regular. No blood. No tenesmus. No rashes, joint pains, mouth sores, eye pain or eye redness.    ROS:    No fevers or chills  No weight loss  No blurry vision, double vision or change in vision  No sore throat  No lymphadenopathy  No headache, paraesthesias, or weakness in a limb  No shortness of breath or wheezing  No chest pain or pressure  No arthralgias or myalgias  No rashes or skin changes  No odynophagia or dysphagia  No BRBPR, hematochezia, melena  No dysuria, frequency or urgency  No hot/cold intolerance or polyria  No anxiety or depression    Extra intestinal manifestations of IBD:  No uveitis/episcleritis  No aphthous ulcers   No arthritis   No erythema nodosum/pyoderma gangrenosum.     PERTINENT PAST MEDICAL HISTORY:  Past Medical History:   Diagnosis Date     Cerebral infarction (H) December 23, 2021    Right vertebral artery dissection       PREVIOUS SURGERIES:  Past Surgical History:   Procedure Laterality Date     COLONOSCOPY  Need one this summer    Crohn s disease     COLONOSCOPY N/A 2/28/2023    Procedure: COLONOSCOPY, WITH BIOPSY;  Surgeon: Gumaro Wood MD;  Location: [facility] OR     GI SURGERY  When I was 21    Iliectomy     ORTHOPEDIC SURGERY  When I was 18    Plate in right arm       PREVIOUS ENDOSCOPY:  Results for orders placed or performed during the hospital encounter of 02/28/23   COLONOSCOPY   Result Value Ref Range    COLONOSCOPY       [facility]  [address] [place] [address], [place] [address] [phone]     Endoscopy  Department  _______________________________________________________________________________  Patient Name: Macrina Gonzales          Procedure Date: 2/28/2023 7:08 AM  MRN: [id number]                       Account Number: [id number]  YOB: 1985              Admit Type: Outpatient  Age: 38                               Room: [facility] PROCEDURE ROOM 02  Gender: Female                        Note Status: Finalized  Attending MD: EMERALD MAGDALENO MD  Total Sedation Time:   _______________________________________________________________________________     Procedure:             Colonoscopy  Indications:           Disease activity assessment of Crohn's disease of the                          small bowel  Providers:             EMERALD MAGDALENO MD, Carol Zaman, CRNA  Referring MD:          Alea Jerry MD  Requesting Provider:   MIKY Jerry MD  Medicines:             Monitored Anesthesia Care  Complications:         No immediate complications.  _______________________________________________________________________________  Procedure:             Pre-Anesthesia Assessment:                         - See EPIC H and P note                         After obtaining informed consent, the colonoscope was                          passed under direct vision. Throughout the procedure,                          the patient's blood pressure, pulse, and oxygen                          saturations were monitored continuously. The                          Colonoscope was introduced through the anus and                          advanced to the ileocolonic anastomosis. The                          colonoscopy was performed without difficulty. The                          patient tolerated the procedure well. The quality of                          the bowel preparation was evaluated using the BBPS                          ( [place] Bowel Preparation Scale) with  scores of: Right                          Colon = 3, Transverse Colon = 3 and Left Colon = 3                          (entire mucosa seen well with no residual staining,                          small fragments of stool or opaque liquid). The total                          BBPS score equals 9.                                                                                   Findings:       Skin tags were found on perianal exam.       The Simple Endoscopic Score for Crohn's Disease was determined based on        the endoscopic appearance of the mucosa in the following segments:       - Ileum: Findings include aphthous ulcers less than 0.5 cm in size, less        than 10% ulcerated surfaces, no affected surfaces and no narrowings.        Segment score: 2.       - Right Colon: Findings include no ulcers present, no ulcerated        surfaces, no affected surfaces and no narrowings. Segment score: 0.       - Transverse Colon: Findings include no ulcers p resent, no ulcerated        surfaces, no affected surfaces and no narrowings. Segment score: 0.       - Left Colon: Findings include no ulcers present, no ulcerated surfaces,        no affected surfaces and no narrowings. Segment score: 0.       - Rectum: Findings include no ulcers present, no ulcerated surfaces, no        affected surfaces and no narrowings. Segment score: 0.       - Total SES-CD aggregate score: 2. Biopsies were taken with a cold        forceps for histology of the ileum, right colon, left colon and rectum        (separate jars).       There was evidence of a prior end-to-end ileo-colonic anastomosis in the        ascending colon. This was patent and was characterized by healthy        appearing mucosa. The anastomosis was traversed.       A few small-mouthed diverticula were found in the sigmoid colon,        descending colon and transverse colon.       The exam was otherwise without abnormality on direct and retroflexion        views.                                                                                     Impression:            There were only 3 very small aphthous ulcers (1-3mm in                          diameter) at the ileo-colonic anastomosis and in the                          distal 5 cm of the yg-terminal ileum. This is                          consistent with Rutgeert's i1. She has been on                          medication for over a year and almost 2 years. Will                          follow up in clinic. Likely check MRE and if                          unremarkable will continue to monitor off therapy                          given how well she has done.                         - Perianal skin tags found on perianal exam.                         - Simple Endoscopic Score for Crohn's Disease: 2,                          mucosal inflammatory changes secondary to Crohn's                          disease, in remission. Biopsied.                         - Patent end-to-end ileo-colonic anastomosis,                          c haracterized by healthy appearing mucosa.                         - Diverticulosis in the sigmoid colon, in the                          descending colon and in the transverse colon.                         - The examination was otherwise normal on direct and                          retroflexion views.  Recommendation:        - Discharge patient to home (with escort).                         - Resume previous diet.                         - Continue present medications.                         - Await pathology results.                         - Repeat colonoscopy in 2 years for surveillance.                         - Return to GI clinic as previously scheduled.                                                                                     Electronically Signed by: Dr. Gumaro Magdaleno  ___________________________  GUMARO MAGDALENO MD  2/28/2023 9:59:45 AM  I was physically present for the entire viewing  portion of the exam.  __________________________  Signature of [facility] physician  EMERALD MAGDALENO MD  Number of Addenda: 0    Note Initiated On: 2023 7:08 AM  Scope In:  Scope Out:     ]    ALLERGIES:   No Known Allergies    PERTINENT MEDICATIONS:    Current Outpatient Medications:      VITAMIN D (CHOLECALCIFEROL) PO, , Disp: , Rfl:     SOCIAL HISTORY:  Social History     Socioeconomic History     Marital status:      Spouse name: Not on file     Number of children: Not on file     Years of education: Not on file     Highest education level: Not on file   Occupational History     Not on file   Tobacco Use     Smoking status: Former     Packs/day: 1.00     Years: 7.00     Pack years: 7.00     Types: Cigarettes     Start date: 2003     Quit date: 2010     Years since quittin.1     Smokeless tobacco: Never     Tobacco comments:     Smoked when I was younger, no longer smoke   Vaping Use     Vaping Use: Never used   Substance and Sexual Activity     Alcohol use: Not Currently     Drug use: Yes     Types: Marijuana     Comment: Take low doses of marijuana for stroke symptoms     Sexual activity: Yes     Partners: Male     Birth control/protection: Pull-out method, Natural Family Planning   Other Topics Concern     Parent/sibling w/ CABG, MI or angioplasty before 65F 55M? No   Social History Narrative     Not on file     Social Determinants of Health     Financial Resource Strain: Not on file   Food Insecurity: Not on file   Transportation Needs: Not on file   Physical Activity: Not on file   Stress: Not on file   Social Connections: Not on file   Intimate Partner Violence: Not on file   Housing Stability: Not on file       FAMILY HISTORY:  Family History   Problem Relation Age of Onset     Anxiety Disorder Mother      Obesity Mother      Diabetes Father      Hypertension Father      Obesity Father      Breast Cancer Maternal Grandmother      Breast Cancer Paternal Grandmother      Anxiety  "Disorder Brother      Breast Cancer Cousin      Colorectal Cancer No family hx of        Past/family/social history reviewed and no changes    PHYSICAL EXAMINATION:  Constitutional: aaox3, cooperative, pleasant, not dyspneic/diaphoretic, no acute distress  Vitals reviewed: /76 (BP Location: Left arm, Patient Position: Sitting, Cuff Size: Adult Regular)   Pulse 92   Ht 1.549 m (5' 1\")   Wt 53.1 kg (117 lb 1.6 oz)   SpO2 100%   BMI 22.13 kg/m    Wt:   Wt Readings from Last 2 Encounters:   03/08/23 53.1 kg (117 lb 1.6 oz)   02/28/23 51.3 kg (113 lb)      Eyes: Sclera anicteric/injected  Ears/nose/mouth/throat: Normal oropharynx without ulcers or exudate, mucus membranes moist, hearing intact  Neck: supple, thyroid normal size  CV: No edema  Respiratory: Unlabored breathing  Lymph: No axillary, submandibular, supraclavicular or inguinal lymphadenopathy  Abd:  Nondistended, +bs, no hepatosplenomegaly, nontender, no peritoneal signs  Skin: warm, perfused, no jaundice  Psych: Normal affect  MSK: Normal gait      PERTINENT STUDIES:  Most recent CBC:  Recent Labs   Lab Test 09/22/22  1155 07/11/22  1528   WBC 7.9 6.5   HGB 13.8 13.1   HCT 42.4 40.7    301     Most recent hepatic panel:  Recent Labs   Lab Test 09/22/22  1253 07/11/22  1528   ALT 14 18   AST 18 23     Most recent creatinine:  Recent Labs   Lab Test 09/22/22  1253 07/11/22  1528   CR 0.79 0.74           Answers for HPI/ROS submitted by the patient on 3/1/2023  General Symptoms: No  Skin Symptoms: No  HENT Symptoms: No  EYE SYMPTOMS: No  HEART SYMPTOMS: No  LUNG SYMPTOMS: No  INTESTINAL SYMPTOMS: No  URINARY SYMPTOMS: No  GYNECOLOGIC SYMPTOMS: No  BREAST SYMPTOMS: No  SKELETAL SYMPTOMS: No  BLOOD SYMPTOMS: No  NERVOUS SYSTEM SYMPTOMS: No  MENTAL HEALTH SYMPTOMS: No          Sincerely,    Gumaro Wood MD  "

## 2023-03-08 NOTE — PROGRESS NOTES
Assessment & Plan:      Problem List Items Addressed This Visit    None  Visit Diagnoses     Strep pharyngitis    -  Primary    Relevant Medications    amoxicillin (AMOXIL) 500 MG capsule    Other Relevant Orders    Streptococcus A Rapid Screen w/Reflex to PCR - Clinic Collect (Completed)    Crohn's disease without complication, unspecified gastrointestinal tract location (H)            Medical Decision Making  Patient with history of Crohn disease presents with elevated inflammatory marker labs as well as recent throat pain and a known exposure to strep pharyngitis.  Rapid strep is positive.  We will treat patient with oral antibiotics.  Change toothbrush in 72 hours.  Discussed treatment and symptomatic care.  Allergies and medication interactions reviewed.  Discussed signs of worsening symptoms and when to follow-up with PCP if no symptom improvement.     Subjective:      Macrina Gonzales is a 38 year old female with history of Crohn's disease, here for evaluation of sore throat and subjective fevers.  Onset of symptoms was 2 to 3 days ago.  Patient's son recently tested positive for strep pharyngitis.  Patient was in a couple days ago to get her Crohn disease labs performed and her inflammatory markers came back significantly elevated.     The following portions of the patient's history were reviewed and updated as appropriate: allergies, current medications, and problem list.     Review of Systems  Pertinent items are noted in HPI.    Allergies  No Known Allergies    Family History   Problem Relation Age of Onset     Anxiety Disorder Mother      Obesity Mother      Diabetes Father      Hypertension Father      Obesity Father      Breast Cancer Maternal Grandmother      Breast Cancer Paternal Grandmother      Anxiety Disorder Brother      Breast Cancer Cousin      Colorectal Cancer No family hx of        Social History     Tobacco Use     Smoking status: Former     Packs/day: 1.00     Years: 7.00     Pack  years: 7.00     Types: Cigarettes     Start date: 2003     Quit date: 2010     Years since quittin.1     Smokeless tobacco: Never     Tobacco comments:     Smoked when I was younger, no longer smoke   Substance Use Topics     Alcohol use: Not Currently        Objective:      /79   Pulse 95   Temp 99.3  F (37.4  C) (Oral)   Resp 16   SpO2 100%   General appearance - alert, well appearing, and in no distress and non-toxic  Mouth - Posterior pharynx is mildly erythematous, no tonsillar swelling or exudate noted  Neck - Moderate bilateral anterior cervical lymphadenopathy     Lab & Imaging Results    Results for orders placed or performed in visit on 23   Streptococcus A Rapid Screen w/Reflex to PCR - Clinic Collect     Status: Abnormal    Specimen: Throat; Swab   Result Value Ref Range    Group A Strep antigen Positive (A) Negative       I personally reviewed these results and discussed findings with the patient.    The use of Dragon/RedHelper dictation services was used to construct the content of this note; any grammatical errors are non-intentional. Please contact the author directly if you are in need of any clarification.

## 2023-03-08 NOTE — PROGRESS NOTES
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 Has done very well since off therapy. 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 At this time we discussed monitoring off therapy vs restarting biologic (adalimumab vs other). Pt desires to avoid meds if able. I think this is reasonable given how well she has done. Will check labs and MRE now. If all well will monitor off therapy.    -continue to monitor off therapy  -check labs toady  -check MRE  -repeat MRE and labs/fecal calpro in 1 year  -colonoscopy in 2 years  -she will notify us if she has recurrent symptoms sooner and will discuss sooner re-evaluation vs starting therapy sooner      IBD HISTORY  Age at diagnosis: 2002  Extent of disease: ileal  Disease phenotype: stricturing  Amanda-anal disease: none  Current CD medications: none  Prior IBD surgeries: ileal resection with IC anastomosis  Prior IBD Medications:  -prednisone - remote courses  -6-MP did not control disease  -infliximab - did well - switched for insurance issues - switched to adalimumab (also easier to use - no infusions)  -adalimumab - started 2016 and continued until CVA 11/2021    DRUG MONITORING  TPMT enzyme activity: 32 (normal)    6-TGN/6-MMPN levels: NA    Biologic concentration: unknown    DISEASE ASSESSMENT  Labs  Recent Labs   Lab Test 09/22/22  1253 09/22/22  1155   CRP <0.1  --    SED  --  8     Fecal calprotectin: 10.9 9/2022  Endoscopy: Colonoscopy 3/2023 - No significant inflammation - 2-3 small aphthous ulcers in yg-terminal ileum/anastomosis - rutgeert's i2  Enterography: pending  C diff: none    sIBDQ:   IBDQ Score Date IBDQ - Total Score  (Higher score better)   3/1/2023 65   9/20/2022 60       IBD Health Care  Maintenance:    Vaccinations:  All patients on biologics should avoid live vaccines.    -- Influenza (every year)  -- TdaP (every 10 years)  -- Pneumococcal Pneumonia (once plus booster at 5 years)  -- Yearly assessment for latent Tb (verbal screening and exam, PPD or QuantiFERON-Tb testing)    One time confirmation of immunity or serologies:  -- Hepatitis A (serologies or immunizations)  -- Hepatitis B (serologies or immunizations)  -- Varicella  -- MMR  -- HPV (all aged 18-26)  -- Meningococcal meningitis (all patients at risk for meningitis)  -- ok for live vaccines      Bone mineral density screening   -- Recommend all patients supplement with calcium and vitamin D  -- Given prior steroid use recommend DEXA if not already done - DISCUSS AT NEXT VISIT    Cancer Screening:  Colon cancer screening:  Given ileal disease, DO NOT recommend patient undergo regular dysplasia surveillance   Next dysplasia screening is recommended NOT NEEDED.    Cervical cancer screening: Per OBGYN    Skin cancer screening: Annual visual exam of skin by dermatologist since patient is immunocompromised    Depression Screening:  -- Over the last month, have you felt down, depressed, or hopeless? no  -- Over the last month, have you felt little interest or pleasure doing things? no    Misc:  -- Avoid tobacco use  -- Avoid NSAIDs as there is potentially a 25% chance of causing an IBD flare    Return to clinic in 6 months with Dunia Arcos and 12 months with [place]    Thank you for this consultation.  It was a pleasure to participate in the care of this patient; please contact us with any further questions.  I spent a total of 40 minutes during the day of encounter performed chart review, meeting with patient, patient counseling, care coordination, and documentation.      This note was created with voice recognition software, and while reviewed for accuracy, typos may remain.     Gumaro Wood MD  Division of Gastroenterology, Hepatology and  Nutrition  [facility]  Pager: [phone]      HPI:   Currently, here for follow up after colonoscopy last week.    She is doing great! Having no symptoms of CD. BMs regular. No blood. No tenesmus. No rashes, joint pains, mouth sores, eye pain or eye redness.    ROS:    No fevers or chills  No weight loss  No blurry vision, double vision or change in vision  No sore throat  No lymphadenopathy  No headache, paraesthesias, or weakness in a limb  No shortness of breath or wheezing  No chest pain or pressure  No arthralgias or myalgias  No rashes or skin changes  No odynophagia or dysphagia  No BRBPR, hematochezia, melena  No dysuria, frequency or urgency  No hot/cold intolerance or polyria  No anxiety or depression    Extra intestinal manifestations of IBD:  No uveitis/episcleritis  No aphthous ulcers   No arthritis   No erythema nodosum/pyoderma gangrenosum.     PERTINENT PAST MEDICAL HISTORY:  Past Medical History:   Diagnosis Date     Cerebral infarction (H) December 23, 2021    Right vertebral artery dissection       PREVIOUS SURGERIES:  Past Surgical History:   Procedure Laterality Date     COLONOSCOPY  Need one this summer    Crohn s disease     COLONOSCOPY N/A 2/28/2023    Procedure: COLONOSCOPY, WITH BIOPSY;  Surgeon: Gumaro Wood MD;  Location: [facility] OR     GI SURGERY  When I was 21    Iliectomy     ORTHOPEDIC SURGERY  When I was 18    Plate in right arm       PREVIOUS ENDOSCOPY:  Results for orders placed or performed during the hospital encounter of 02/28/23   COLONOSCOPY   Result Value Ref Range    COLONOSCOPY       [facility]  [address] [place] [id number] [phone]     Endoscopy Department  _______________________________________________________________________________  Patient Name: Macrina Gonzales          Procedure Date: 2/28/2023 7:08 AM  MRN: [id number]                       Account Number: [id number]  YOB: 1985              Admit Type:  Outpatient  Age: 38                               Room: [facility] PROCEDURE ROOM 02  Gender: Female                        Note Status: Finalized  Attending MD: EMERALD MAGDALENO MD  Total Sedation Time:   _______________________________________________________________________________     Procedure:             Colonoscopy  Indications:           Disease activity assessment of Crohn's disease of the                          small bowel  Providers:             EMERALD MAGDALENO MD, Carol Zaman, CRNA  Referring MD:          Alea Jerry MD  Requesting Provider:   MIKY Jerry MD  Medicines:             Monitored Anesthesia Care  Complications:         No immediate complications.  _______________________________________________________________________________  Procedure:             Pre-Anesthesia Assessment:                         - See EPIC H and P note                         After obtaining informed consent, the colonoscope was                          passed under direct vision. Throughout the procedure,                          the patient's blood pressure, pulse, and oxygen                          saturations were monitored continuously. The                          Colonoscope was introduced through the anus and                          advanced to the ileocolonic anastomosis. The                          colonoscopy was performed without difficulty. The                          patient tolerated the procedure well. The quality of                          the bowel preparation was evaluated using the BBPS                          ( [place] Bowel Preparation Scale) with scores of: Right                          Colon = 3, Transverse Colon = 3 and Left Colon = 3                          (entire mucosa seen well with no residual staining,                          small fragments of stool or opaque liquid). The total                          BBPS  score equals 9.                                                                                   Findings:       Skin tags were found on perianal exam.       The Simple Endoscopic Score for Crohn's Disease was determined based on        the endoscopic appearance of the mucosa in the following segments:       - Ileum: Findings include aphthous ulcers less than 0.5 cm in size, less        than 10% ulcerated surfaces, no affected surfaces and no narrowings.        Segment score: 2.       - Right Colon: Findings include no ulcers present, no ulcerated        surfaces, no affected surfaces and no narrowings. Segment score: 0.       - Transverse Colon: Findings include no ulcers p resent, no ulcerated        surfaces, no affected surfaces and no narrowings. Segment score: 0.       - Left Colon: Findings include no ulcers present, no ulcerated surfaces,        no affected surfaces and no narrowings. Segment score: 0.       - Rectum: Findings include no ulcers present, no ulcerated surfaces, no        affected surfaces and no narrowings. Segment score: 0.       - Total SES-CD aggregate score: 2. Biopsies were taken with a cold        forceps for histology of the ileum, right colon, left colon and rectum        (separate jars).       There was evidence of a prior end-to-end ileo-colonic anastomosis in the        ascending colon. This was patent and was characterized by healthy        appearing mucosa. The anastomosis was traversed.       A few small-mouthed diverticula were found in the sigmoid colon,        descending colon and transverse colon.       The exam was otherwise without abnormality on direct and retroflexion        views.                                                                                    Impression:            There were only 3 very small aphthous ulcers (1-3mm in                          diameter) at the ileo-colonic anastomosis and in the                          distal 5 cm of the  yg-terminal ileum. This is                          consistent with Rutgeert's i1. She has been on                          medication for over a year and almost 2 years. Will                          follow up in clinic. Likely check MRE and if                          unremarkable will continue to monitor off therapy                          given how well she has done.                         - Perianal skin tags found on perianal exam.                         - Simple Endoscopic Score for Crohn's Disease: 2,                          mucosal inflammatory changes secondary to Crohn's                          disease, in remission. Biopsied.                         - Patent end-to-end ileo-colonic anastomosis,                          c haracterized by healthy appearing mucosa.                         - Diverticulosis in the sigmoid colon, in the                          descending colon and in the transverse colon.                         - The examination was otherwise normal on direct and                          retroflexion views.  Recommendation:        - Discharge patient to home (with escort).                         - Resume previous diet.                         - Continue present medications.                         - Await pathology results.                         - Repeat colonoscopy in 2 years for surveillance.                         - Return to GI clinic as previously scheduled.                                                                                     Electronically Signed by: Dr. Gumaro Magdaleno  ___________________________  GUMARO MAGDALENO MD  2/28/2023 9:59:45 AM  I was physically present for the entire viewing portion of the exam.  __________________________  Signature of [facility] physician  GUMARO MAGDALENO MD  Number of Addenda: 0    Note Initiated On: 2/28/2023 7:08 AM  Scope In:  Scope Out:     ]    ALLERGIES:   No Known Allergies    PERTINENT MEDICATIONS:    Current  Outpatient Medications:      VITAMIN D (CHOLECALCIFEROL) PO, , Disp: , Rfl:     SOCIAL HISTORY:  Social History     Socioeconomic History     Marital status:      Spouse name: Not on file     Number of children: Not on file     Years of education: Not on file     Highest education level: Not on file   Occupational History     Not on file   Tobacco Use     Smoking status: Former     Packs/day: 1.00     Years: 7.00     Pack years: 7.00     Types: Cigarettes     Start date: 2003     Quit date: 2010     Years since quittin.1     Smokeless tobacco: Never     Tobacco comments:     Smoked when I was younger, no longer smoke   Vaping Use     Vaping Use: Never used   Substance and Sexual Activity     Alcohol use: Not Currently     Drug use: Yes     Types: Marijuana     Comment: Take low doses of marijuana for stroke symptoms     Sexual activity: Yes     Partners: Male     Birth control/protection: Pull-out method, Natural Family Planning   Other Topics Concern     Parent/sibling w/ CABG, MI or angioplasty before 65F 55M? No   Social History Narrative     Not on file     Social Determinants of Health     Financial Resource Strain: Not on file   Food Insecurity: Not on file   Transportation Needs: Not on file   Physical Activity: Not on file   Stress: Not on file   Social Connections: Not on file   Intimate Partner Violence: Not on file   Housing Stability: Not on file       FAMILY HISTORY:  Family History   Problem Relation Age of Onset     Anxiety Disorder Mother      Obesity Mother      Diabetes Father      Hypertension Father      Obesity Father      Breast Cancer Maternal Grandmother      Breast Cancer Paternal Grandmother      Anxiety Disorder Brother      Breast Cancer Cousin      Colorectal Cancer No family hx of        Past/family/social history reviewed and no changes    PHYSICAL EXAMINATION:  Constitutional: aaox3, cooperative, pleasant, not dyspneic/diaphoretic, no acute distress  Vitals  "reviewed: /76 (BP Location: Left arm, Patient Position: Sitting, Cuff Size: Adult Regular)   Pulse 92   Ht 1.549 m (5' 1\")   Wt 53.1 kg (117 lb 1.6 oz)   SpO2 100%   BMI 22.13 kg/m    Wt:   Wt Readings from Last 2 Encounters:   03/08/23 53.1 kg (117 lb 1.6 oz)   02/28/23 51.3 kg (113 lb)      Eyes: Sclera anicteric/injected  Ears/nose/mouth/throat: Normal oropharynx without ulcers or exudate, mucus membranes moist, hearing intact  Neck: supple, thyroid normal size  CV: No edema  Respiratory: Unlabored breathing  Lymph: No axillary, submandibular, supraclavicular or inguinal lymphadenopathy  Abd:  Nondistended, +bs, no hepatosplenomegaly, nontender, no peritoneal signs  Skin: warm, perfused, no jaundice  Psych: Normal affect  MSK: Normal gait      PERTINENT STUDIES:  Most recent CBC:  Recent Labs   Lab Test 09/22/22  1155 07/11/22  1528   WBC 7.9 6.5   HGB 13.8 13.1   HCT 42.4 40.7    301     Most recent hepatic panel:  Recent Labs   Lab Test 09/22/22  1253 07/11/22  1528   ALT 14 18   AST 18 23     Most recent creatinine:  Recent Labs   Lab Test 09/22/22  1253 07/11/22  1528   CR 0.79 0.74           Answers for HPI/ROS submitted by the patient on 3/1/2023  General Symptoms: No  Skin Symptoms: No  HENT Symptoms: No  EYE SYMPTOMS: No  HEART SYMPTOMS: No  LUNG SYMPTOMS: No  INTESTINAL SYMPTOMS: No  URINARY SYMPTOMS: No  GYNECOLOGIC SYMPTOMS: No  BREAST SYMPTOMS: No  SKELETAL SYMPTOMS: No  BLOOD SYMPTOMS: No  NERVOUS SYSTEM SYMPTOMS: No  MENTAL HEALTH SYMPTOMS: No      "

## 2023-03-22 ENCOUNTER — HOSPITAL ENCOUNTER (OUTPATIENT)
Dept: MRI IMAGING | Facility: CLINIC | Age: 38
Discharge: HOME | End: 2023-03-22
Attending: INTERNAL MEDICINE | Admitting: INTERNAL MEDICINE
Payer: COMMERCIAL

## 2023-03-22 DIAGNOSIS — K50.00 CROHN'S DISEASE OF SMALL INTESTINE WITHOUT COMPLICATION (H): ICD-10-CM

## 2023-03-22 PROCEDURE — A9585 GADOBUTROL INJECTION: HCPCS | Performed by: INTERNAL MEDICINE

## 2023-03-22 PROCEDURE — 255N000002 HC RX 255 OP 636: Performed by: INTERNAL MEDICINE

## 2023-03-22 PROCEDURE — 250N000011 HC RX IP 250 OP 636: Performed by: INTERNAL MEDICINE

## 2023-03-22 PROCEDURE — 74183 MRI ABD W/O CNTR FLWD CNTR: CPT

## 2023-03-22 RX ORDER — GADOBUTROL 604.72 MG/ML
6 INJECTION INTRAVENOUS ONCE
Status: COMPLETED | OUTPATIENT
Start: 2023-03-22 | End: 2023-03-22

## 2023-03-22 RX ADMIN — GLUCAGON 0.5 MG: 1 INJECTION, POWDER, LYOPHILIZED, FOR SOLUTION INTRAMUSCULAR; INTRAVENOUS at 08:37

## 2023-03-22 RX ADMIN — GADOBUTROL 6 ML: 604.72 INJECTION INTRAVENOUS at 08:47

## 2023-03-22 RX ADMIN — GLUCAGON 0.5 MG: 1 INJECTION, POWDER, LYOPHILIZED, FOR SOLUTION INTRAMUSCULAR; INTRAVENOUS at 08:10

## 2023-03-22 NOTE — PROGRESS NOTES
Patient is not diabetic. Does not have insulinoma or pheochromocytoma. Glucagon given per protocol.

## 2023-06-05 NOTE — TELEPHONE ENCOUNTER
Attempted to contact the patient via telephone. No answer, left detailed message with instructions to complete the lab draw as recommended.

## 2023-08-13 ENCOUNTER — HEALTH MAINTENANCE LETTER (OUTPATIENT)
Age: 38
End: 2023-08-13

## 2023-09-07 ENCOUNTER — MYC MEDICAL ADVICE (OUTPATIENT)
Dept: GASTROENTEROLOGY | Facility: CLINIC | Age: 38
End: 2023-09-07
Payer: COMMERCIAL

## 2023-09-15 NOTE — TELEPHONE ENCOUNTER
Called to remind patient of their upcoming appointment with our GI clinic, on Thursday 9/21/2023 at 8:00AM with Dunia Arcos. This appointment is scheduled as a video visit. You will receive a call approximately 30 minutes prior to check you in, you must be in [place] for this visit., if your appointment is virtual (video or telephone) you need to be in [place] for the visit. To reschedule or cancel appointment patient needs to call [phone] option 1.  To confirm appointment patient advised to call back.     Alysia Paulson MA

## 2023-09-21 ENCOUNTER — VIRTUAL VISIT (OUTPATIENT)
Dept: GASTROENTEROLOGY | Facility: CLINIC | Age: 38
End: 2023-09-21
Payer: COMMERCIAL

## 2023-09-21 DIAGNOSIS — K50.00 CROHN'S DISEASE OF SMALL INTESTINE WITHOUT COMPLICATION (H): Primary | ICD-10-CM

## 2023-09-21 PROCEDURE — 99214 OFFICE O/P EST MOD 30 MIN: CPT | Mod: VID | Performed by: DIETITIAN, REGISTERED

## 2023-09-21 ASSESSMENT — ENCOUNTER SYMPTOMS
LOSS OF CONSCIOUSNESS: 0
SORE THROAT: 1
NERVOUS/ANXIOUS: 1
NECK MASS: 0
TROUBLE SWALLOWING: 0
MEMORY LOSS: 0
SINUS PAIN: 0
HOARSE VOICE: 0
TREMORS: 0
WEAKNESS: 0
TINGLING: 1
SINUS CONGESTION: 1
SMELL DISTURBANCE: 0
DIZZINESS: 0
DISTURBANCES IN COORDINATION: 0
DECREASED CONCENTRATION: 0
PARALYSIS: 0
SEIZURES: 0
HEADACHES: 1
TASTE DISTURBANCE: 0
PANIC: 0
INSOMNIA: 1
NUMBNESS: 0
SPEECH CHANGE: 0
DEPRESSION: 0

## 2023-09-21 NOTE — NURSING NOTE
Is the patient currently in the state of [place]? YES    Visit mode:VIDEO    If the visit is dropped, the patient can be reconnected by: VIDEO VISIT: Text to cell phone:   Telephone Information:   Mobile [phone]       Will anyone else be joining the visit? NO  (If patient encounters technical issues they should call [phone]    How would you like to obtain your AVS? MyChart    Are changes needed to the allergy or medication list? No    Reason for visit: RECHECK    Chele RUANO

## 2023-09-21 NOTE — LETTER
9/21/2023         RE: Macrina Gonzales  [address]  [facility] [address]        Dear Colleague,    Thank you for referring your patient, Macrina Gonzales, to the [facility] [place]. Please see a copy of my visit note below.    Virtual Visit Details    Type of service:  Video Visit     Originating Location (pt. 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 MRE in March again showing no active inflammation. Last labs in March with elevation in CRP and WBC however she was ill with a cold at that time. We will recheck labs now. Given how well she is done, we will continue to monitor off of therapy, she would like to avoid medications if possible. We will plan to recheck labs, MRE, and fecal velasquez protectin in 6 months and colonoscopy early 2025 (2 years from last).    -- Continue to monitor off therapy  -- Recheck labs (CBC, CRP, ESR, LFTs)  -- Repeat MRE, fecal calprotectin, and labs in 6 months  -- Colonoscopy early 2025  -- Notify us if she has recurrent symptoms sooner and will discuss sooner re-evaluation vs starting therapy sooner      IBD HISTORY  Age at diagnosis: 2002  Extent of disease: ileal  Disease phenotype: stricturing  Amanda-anal disease: none  Current CD medications: none  Prior IBD surgeries: ileal resection with IC anastomosis  Prior IBD Medications:  -prednisone - remote courses  -6-MP did not control disease  -infliximab - did well - switched for insurance issues - switched to adalimumab (also easier to use - no infusions)  -adalimumab - started  2016 and continued until CVA 11/2021    DRUG MONITORING  TPMT enzyme activity: 32 (normal)    6-TGN/6-MMPN levels: NA    Biologic concentration: unknown    DISEASE ASSESSMENT  Labs  Recent Labs   Lab Test 03/08/23  0842 09/22/22  1253 09/22/22  1155   CRP  --  <0.1  --    SED 12  --  8     Fecal calprotectin: 10.9 9/2022  Endoscopy: Colonoscopy 3/2023 - No significant inflammation - 2-3 small aphthous ulcers in yg-terminal ileum/anastomosis - rutgeert's i2  Enterography: 3/22/23 no evidence of active inflammation, ileocecal anastomosis appears patent. No fistulas, abscesses or other evidence of penetrating disease. No perianal inflammation or fluid collections.  C diff: none    sIBDQ:   IBDQ Score Date IBDQ - Total Score  (Higher score better)   9/21/2023   7:30 AM 66   9/15/2023   1:47 PM 68   3/1/2023  10:24 AM 65   9/20/2022   9:04 PM 60       IBD Health Care Maintenance:    Vaccinations:  All patients on biologics should avoid live vaccines.    -- Influenza (every year)  -- TdaP (every 10 years)  -- Pneumococcal Pneumonia (once plus booster at 5 years)  -- Yearly assessment for latent Tb (verbal screening and exam, PPD or QuantiFERON-Tb testing)    One time confirmation of immunity or serologies:  -- Hepatitis A (serologies or immunizations)  -- Hepatitis B (serologies or immunizations)  -- Varicella  -- MMR  -- HPV (all aged 18-26)  -- Meningococcal meningitis (all patients at risk for meningitis)  -- ok for live vaccines      Bone mineral density screening   -- Recommend all patients supplement with calcium and vitamin D  -- Given prior steroid use recommend DEXA if not already done - DISCUSS AT NEXT VISIT    Cancer Screening:  Colon cancer screening:  Given ileal disease, DO NOT recommend patient undergo regular dysplasia surveillance   Next dysplasia screening is recommended NOT NEEDED.    Cervical cancer screening: Per OBGYN    Skin cancer screening: Annual visual exam of skin by dermatologist since  patient is immunocompromised    Depression Screening:  -- Over the last month, have you felt down, depressed, or hopeless? no  -- Over the last month, have you felt little interest or pleasure doing things? no    Misc:  -- Avoid tobacco use  -- Avoid NSAIDs as there is potentially a 25% chance of causing an IBD flare    Return to clinic in 6 months with Dr. Wood    Thank you for this consultation.  It was a pleasure to participate in the care of this patient; please contact us with any further questions.  I spent a total of 35 minutes during the day of encounter performed chart review, meeting with patient, patient counseling, care coordination, and documentation.      This note was created with voice recognition software, and while reviewed for accuracy, typos may remain.     Dunia Arcos PA-C  Division of Gastroenterology, Hepatology & Nutrition  [facility]        HPI:   Here today for routine follow up. She continues off of all therapy.    Labs last done in March did show elevation of CRP and WBC - reports she had cold around this time. Subsequent MRE 3/22/23 without any signs of inflammation.    Macrina reports she continues to do well. No specific questions or concerns today.     She reports she continues to have regular bowel movements, 2-4 formed, non urgent stools. No blood. No tenesmus. No abdominal pain. No EIM.    Eating a high fiber diet and exercising frequently.      Cresencio Beltre Index:  General well-being: very well = 0  Abdominal pain: None = 0  Number of liquid stools per day: 0  Abdominal mass: None = 0  Current Complications: none    Remission <5  Mild activity 5-7  Moderate activity 8-16  Severe > 16    ROS:  Complete 10 System ROS performed. All are negative except as documented below, in the HPI, or in patient questionnaire from today's visit.  No fevers or chills  No weight loss  No blurry vision, double vision or change in vision  No sore throat  No lymphadenopathy  No  headache, paraesthesias, or weakness in a limb  No shortness of breath or wheezing  No chest pain or pressure  No arthralgias or myalgias  No rashes or skin changes  No odynophagia or dysphagia  No BRBPR, hematochezia, melena  No dysuria, frequency or urgency  No hot/cold intolerance or polyria  No anxiety or depression    Extra intestinal manifestations of IBD:  No uveitis/episcleritis  No aphthous ulcers   No arthritis   No erythema nodosum/pyoderma gangrenosum.         PERTINENT PAST MEDICAL HISTORY:  Past Medical History:   Diagnosis Date    Cerebral infarction (H) December 23, 2021    Right vertebral artery dissection       PREVIOUS SURGERIES:  Past Surgical History:   Procedure Laterality Date    COLONOSCOPY  Need one this summer    Crohn s disease    COLONOSCOPY N/A 2/28/2023    Procedure: COLONOSCOPY, WITH BIOPSY;  Surgeon: Gumaro Magdaleno MD;  Location: [facility] OR    GI SURGERY  When I was 21    Iliectomy    ORTHOPEDIC SURGERY  When I was 18    Plate in right arm       PREVIOUS ENDOSCOPY:  Results for orders placed or performed during the hospital encounter of 02/28/23   COLONOSCOPY   Result Value Ref Range    COLONOSCOPY       [facility]  [address] [place] [phone]     Endoscopy Department  _______________________________________________________________________________  Patient Name: Macrina Gonzales          Procedure Date: 2/28/2023 7:08 AM  MRN: [id number]                       Account Number: [id number]  YOB: 1985              Admit Type: Outpatient  Age: 38                               Room: [facility] PROCEDURE ROOM 02  Gender: Female                        Note Status: Finalized  Attending MD: GUMARO MAGDALENO MD  Total Sedation Time:   _______________________________________________________________________________     Procedure:             Colonoscopy  Indications:           Disease activity assessment of Crohn's disease of the                           small bowel  Providers:             EMERALD MAGDALENO MD, Carol Zaman, CRNA  Referring MD:          Alea Jerry MD  Requesting Provider:   MIKY Jerry MD  Medicines:             Monitored Anesthesia Care  Complications:         No immediate complications.  _______________________________________________________________________________  Procedure:             Pre-Anesthesia Assessment:                         - See EPIC H and P note                         After obtaining informed consent, the colonoscope was                          passed under direct vision. Throughout the procedure,                          the patient's blood pressure, pulse, and oxygen                          saturations were monitored continuously. The                          Colonoscope was introduced through the anus and                          advanced to the ileocolonic anastomosis. The                          colonoscopy was performed without difficulty. The                          patient tolerated the procedure well. The quality of                          the bowel preparation was evaluated using the BBPS                          ( Butler Bowel Preparation Scale) with scores of: Right                          Colon = 3, Transverse Colon = 3 and Left Colon = 3                          (entire mucosa seen well with no residual staining,                          small fragments of stool or opaque liquid). The total                          BBPS score equals 9.                                                                                   Findings:       Skin tags were found on perianal exam.       The Simple Endoscopic Score for Crohn's Disease was determined based on        the endoscopic appearance of the mucosa in the following segments:       - Ileum: Findings include aphthous ulcers less than 0.5 cm in size, less        than 10% ulcerated surfaces,  no affected surfaces and no narrowings.        Segment score: 2.       - Right Colon: Findings include no ulcers present, no ulcerated        surfaces, no affected surfaces and no narrowings. Segment score: 0.       - Transverse Colon: Findings include no ulcers p resent, no ulcerated        surfaces, no affected surfaces and no narrowings. Segment score: 0.       - Left Colon: Findings include no ulcers present, no ulcerated surfaces,        no affected surfaces and no narrowings. Segment score: 0.       - Rectum: Findings include no ulcers present, no ulcerated surfaces, no        affected surfaces and no narrowings. Segment score: 0.       - Total SES-CD aggregate score: 2. Biopsies were taken with a cold        forceps for histology of the ileum, right colon, left colon and rectum        (separate jars).       There was evidence of a prior end-to-end ileo-colonic anastomosis in the        ascending colon. This was patent and was characterized by healthy        appearing mucosa. The anastomosis was traversed.       A few small-mouthed diverticula were found in the sigmoid colon,        descending colon and transverse colon.       The exam was otherwise without abnormality on direct and retroflexion        views.                                                                                    Impression:            There were only 3 very small aphthous ulcers (1-3mm in                          diameter) at the ileo-colonic anastomosis and in the                          distal 5 cm of the yg-terminal ileum. This is                          consistent with Rutgeert's i1. She has been on                          medication for over a year and almost 2 years. Will                          follow up in clinic. Likely check MRE and if                          unremarkable will continue to monitor off therapy                          given how well she has done.                         - Perianal skin tags found on  perianal exam.                         - Simple Endoscopic Score for Crohn's Disease: 2,                          mucosal inflammatory changes secondary to Crohn's                          disease, in remission. Biopsied.                         - Patent end-to-end ileo-colonic anastomosis,                          c haracterized by healthy appearing mucosa.                         - Diverticulosis in the sigmoid colon, in the                          descending colon and in the transverse colon.                         - The examination was otherwise normal on direct and                          retroflexion views.  Recommendation:        - Discharge patient to home (with escort).                         - Resume previous diet.                         - Continue present medications.                         - Await pathology results.                         - Repeat colonoscopy in 2 years for surveillance.                         - Return to GI clinic as previously scheduled.                                                                                     Electronically Signed by: Dr. Gumaro Magdaleno  ___________________________  GUMARO MAGDALENO MD  2/28/2023 9:59:45 AM  I was physically present for the entire viewing portion of the exam.  __________________________  Signature of [facility]  GUMARO MAGDALENO MD  Number of Addenda: 0    Note Initiated On: 2/28/2023 7:08 AM  Scope In:  Scope Out:     ]    ALLERGIES:   No Known Allergies    PERTINENT MEDICATIONS:    Current Outpatient Medications:     VITAMIN D (CHOLECALCIFEROL) PO, , Disp: , Rfl:     SOCIAL HISTORY:  Social History     Socioeconomic History    Marital status:      Spouse name: Not on file    Number of children: Not on file    Years of education: Not on file    Highest education level: Not on file   Occupational History    Not on file   Tobacco Use    Smoking status: Former     Packs/day: 1.00     Years: 7.00     Pack years:  7.00     Types: Cigarettes     Start date: 2003     Quit date: 2010     Years since quittin.7    Smokeless tobacco: Never    Tobacco comments:     Smoked when I was younger, no longer smoke   Vaping Use    Vaping Use: Never used   Substance and Sexual Activity    Alcohol use: Not Currently    Drug use: Yes     Types: Marijuana     Comment: Take low doses of marijuana for stroke symptoms    Sexual activity: Yes     Partners: Male     Birth control/protection: Pull-out method, Natural Family Planning   Other Topics Concern    Parent/sibling w/ CABG, MI or angioplasty before 65F 55M? No   Social History Narrative    Not on file     Social Determinants of Health     Financial Resource Strain: Not on file   Food Insecurity: Not on file   Transportation Needs: Not on file   Physical Activity: Not on file   Stress: Not on file   Social Connections: Not on file   Interpersonal Safety: Not on file   Housing Stability: Not on file       FAMILY HISTORY:  Family History   Problem Relation Age of Onset    Anxiety Disorder Mother     Obesity Mother     Diabetes Father     Hypertension Father     Obesity Father     Breast Cancer Maternal Grandmother     Breast Cancer Paternal Grandmother     Anxiety Disorder Brother     Breast Cancer Cousin     Colorectal Cancer No family hx of        Past/family/social history reviewed and no changes    PHYSICAL EXAMINATION:  Constitutional: aaox3, cooperative, pleasant, not dyspneic/diaphoretic, no acute distress  Vitals reviewed: There were no vitals taken for this visit.  Wt:   Wt Readings from Last 2 Encounters:   23 53.1 kg (117 lb 1.6 oz)   23 51.3 kg (113 lb)      Eyes: Sclera anicteric/injected  Ears/nose/mouth/throat: Normal oropharynx without ulcers or exudate, mucus membranes moist, hearing intact  Neck: supple, thyroid normal size  CV: No edema  Respiratory: Unlabored breathing  Lymph: No axillary, submandibular, supraclavicular or inguinal  lymphadenopathy  Abd:  Nondistended, +bs, no hepatosplenomegaly, nontender, no peritoneal signs  Skin: warm, perfused, no jaundice  Psych: Normal affect  MSK: Normal gait      PERTINENT STUDIES:  Most recent CBC:  Recent Labs   Lab Test 03/08/23  0842 09/22/22  1155   WBC 11.2* 7.9   HGB 13.8 13.8   HCT 42.4 42.4    281     Most recent hepatic panel:  Recent Labs   Lab Test 03/08/23  0842 09/22/22  1253   ALT 13 14   AST 16 18     Most recent creatinine:  Recent Labs   Lab Test 03/08/23 0842 09/22/22  1253   CR 0.80 0.79         Again, thank you for allowing me to participate in the care of your patient.      Sincerely,    Dunia Arcos PA-C

## 2023-09-21 NOTE — PROGRESS NOTES
Virtual Visit Details    Type of service:  Video Visit     Originating Location (pt. 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 MRE in March again showing no active inflammation. Last labs in March with elevation in CRP and WBC however she was ill with a cold at that time. We will recheck labs now. Given how well she is done, we will continue to monitor off of therapy, she would like to avoid medications if possible. We will plan to recheck labs, MRE, and fecal velasquez protectin in 6 months and colonoscopy early 2025 (2 years from last).    -- Continue to monitor off therapy  -- Recheck labs (CBC, CRP, ESR, LFTs)  -- Repeat MRE, fecal calprotectin, and labs in 6 months  -- Colonoscopy early 2025  -- Notify us if she has recurrent symptoms sooner and will discuss sooner re-evaluation vs starting therapy sooner      IBD HISTORY  Age at diagnosis: 2002  Extent of disease: ileal  Disease phenotype: stricturing  Amanda-anal disease: none  Current CD medications: none  Prior IBD surgeries: ileal resection with IC anastomosis  Prior IBD Medications:  -prednisone - remote courses  -6-MP did not control disease  -infliximab - did well - switched for insurance issues - switched to adalimumab (also easier to use - no infusions)  -adalimumab - started 2016 and continued until CVA 11/2021    DRUG MONITORING  TPMT enzyme activity: 32 (normal)    6-TGN/6-MMPN levels: NA    Biologic concentration: unknown    DISEASE ASSESSMENT  Labs  Recent Labs   Lab Test 03/08/23  0842 09/22/22  1253 09/22/22  1155   CRP  --  <0.1  --     SED 12  --  8     Fecal calprotectin: 10.9 9/2022  Endoscopy: Colonoscopy 3/2023 - No significant inflammation - 2-3 small aphthous ulcers in yg-terminal ileum/anastomosis - rutgeert's i2  Enterography: 3/22/23 no evidence of active inflammation, ileocecal anastomosis appears patent. No fistulas, abscesses or other evidence of penetrating disease. No perianal inflammation or fluid collections.  C diff: none    sIBDQ:   IBDQ Score Date IBDQ - Total Score  (Higher score better)   9/21/2023   7:30 AM 66   9/15/2023   1:47 PM 68   3/1/2023  10:24 AM 65   9/20/2022   9:04 PM 60       IBD Health Care Maintenance:    Vaccinations:  All patients on biologics should avoid live vaccines.    -- Influenza (every year)  -- TdaP (every 10 years)  -- Pneumococcal Pneumonia (once plus booster at 5 years)  -- Yearly assessment for latent Tb (verbal screening and exam, PPD or QuantiFERON-Tb testing)    One time confirmation of immunity or serologies:  -- Hepatitis A (serologies or immunizations)  -- Hepatitis B (serologies or immunizations)  -- Varicella  -- MMR  -- HPV (all aged 18-26)  -- Meningococcal meningitis (all patients at risk for meningitis)  -- ok for live vaccines      Bone mineral density screening   -- Recommend all patients supplement with calcium and vitamin D  -- Given prior steroid use recommend DEXA if not already done - DISCUSS AT NEXT VISIT    Cancer Screening:  Colon cancer screening:  Given ileal disease, DO NOT recommend patient undergo regular dysplasia surveillance   Next dysplasia screening is recommended NOT NEEDED.    Cervical cancer screening: Per OBGYN    Skin cancer screening: Annual visual exam of skin by dermatologist since patient is immunocompromised    Depression Screening:  -- Over the last month, have you felt down, depressed, or hopeless? no  -- Over the last month, have you felt little interest or pleasure doing things? no    Misc:  -- Avoid tobacco use  -- Avoid NSAIDs as there is  potentially a 25% chance of causing an IBD flare    Return to clinic in 6 months with Dr. Wood    Thank you for this consultation.  It was a pleasure to participate in the care of this patient; please contact us with any further questions.  I spent a total of 35 minutes during the day of encounter performed chart review, meeting with patient, patient counseling, care coordination, and documentation.      This note was created with voice recognition software, and while reviewed for accuracy, typos may remain.     Dunia Arcos PA-C  Division of Gastroenterology, Hepatology & Nutrition  [facility]        HPI:   Here today for routine follow up. She continues off of all therapy.    Labs last done in March did show elevation of CRP and WBC - reports she had cold around this time. Subsequent MRE 3/22/23 without any signs of inflammation.    Macrina reports she continues to do well. No specific questions or concerns today.     She reports she continues to have regular bowel movements, 2-4 formed, non urgent stools. No blood. No tenesmus. No abdominal pain. No EIM.    Eating a high fiber diet and exercising frequently.      Cresencio Beltre Index:  General well-being: very well = 0  Abdominal pain: None = 0  Number of liquid stools per day: 0  Abdominal mass: None = 0  Current Complications: none    Remission <5  Mild activity 5-7  Moderate activity 8-16  Severe > 16    ROS:  Complete 10 System ROS performed. All are negative except as documented below, in the HPI, or in patient questionnaire from today's visit.  No fevers or chills  No weight loss  No blurry vision, double vision or change in vision  No sore throat  No lymphadenopathy  No headache, paraesthesias, or weakness in a limb  No shortness of breath or wheezing  No chest pain or pressure  No arthralgias or myalgias  No rashes or skin changes  No odynophagia or dysphagia  No BRBPR, hematochezia, melena  No dysuria, frequency or urgency  No hot/cold  intolerance or polyria  No anxiety or depression    Extra intestinal manifestations of IBD:  No uveitis/episcleritis  No aphthous ulcers   No arthritis   No erythema nodosum/pyoderma gangrenosum.         PERTINENT PAST MEDICAL HISTORY:  Past Medical History:   Diagnosis Date    Cerebral infarction (H) December 23, 2021    Right vertebral artery dissection       PREVIOUS SURGERIES:  Past Surgical History:   Procedure Laterality Date    COLONOSCOPY  Need one this summer    Crohn s disease    COLONOSCOPY N/A 2/28/2023    Procedure: COLONOSCOPY, WITH BIOPSY;  Surgeon: Emerald Wood MD;  [place] [facility] [place]    GI SURGERY  When I was 21    Iliectomy    ORTHOPEDIC SURGERY  When I was 18    Plate in right arm       PREVIOUS ENDOSCOPY:  Results for orders placed or performed during the hospital encounter of 02/28/23   COLONOSCOPY   Result Value Ref Range    COLONOSCOPY       [facility]  [address] [place] [address], [place] [address] [phone]     Endoscopy Department  _______________________________________________________________________________  Patient Name: Macrina Gonzales          Procedure Date: 2/28/2023 7:08 AM  MRN: [id number]                       Account Number: [id number]  YOB: 1985              Admit Type: Outpatient  Age: 38                               [place] [facility] [place] 02  Gender: Female                        Note Status: Finalized  Attending MD: EMERALD WOOD MD  Total Sedation Time:   _______________________________________________________________________________     Procedure:             Colonoscopy  Indications:           Disease activity assessment of Crohn's disease of the                          small bowel  Providers:             EMERALD WOOD MD, Carol Zaman, CRNA  Referring MD:          Alea Jerry MD  Requesting Provider:   MIKY Jerry MD  Medicines:             Monitored  Anesthesia Care  Complications:         No immediate complications.  _______________________________________________________________________________  Procedure:             Pre-Anesthesia Assessment:                         - See EPIC H and P note                         After obtaining informed consent, the colonoscope was                          passed under direct vision. Throughout the procedure,                          the patient's blood pressure, pulse, and oxygen                          saturations were monitored continuously. The                          Colonoscope was introduced through the anus and                          advanced to the ileocolonic anastomosis. The                          colonoscopy was performed without difficulty. The                          patient tolerated the procedure well. The quality of                          the bowel preparation was evaluated using the BBPS                          ( [place] Bowel Preparation Scale) with scores of: Right                          Colon = 3, Transverse Colon = 3 and Left Colon = 3                          (entire mucosa seen well with no residual staining,                          small fragments of stool or opaque liquid). The total                          BBPS score equals 9.                                                                                   Findings:       Skin tags were found on perianal exam.       The Simple Endoscopic Score for Crohn's Disease was determined based on        the endoscopic appearance of the mucosa in the following segments:       - Ileum: Findings include aphthous ulcers less than 0.5 cm in size, less        than 10% ulcerated surfaces, no affected surfaces and no narrowings.        Segment score: 2.       - Right Colon: Findings include no ulcers present, no ulcerated        surfaces, no affected surfaces and no narrowings. Segment score: 0.       - Transverse Colon: Findings include no ulcers p  resent, no ulcerated        surfaces, no affected surfaces and no narrowings. Segment score: 0.       - Left Colon: Findings include no ulcers present, no ulcerated surfaces,        no affected surfaces and no narrowings. Segment score: 0.       - Rectum: Findings include no ulcers present, no ulcerated surfaces, no        affected surfaces and no narrowings. Segment score: 0.       - Total SES-CD aggregate score: 2. Biopsies were taken with a cold        forceps for histology of the ileum, right colon, left colon and rectum        (separate jars).       There was evidence of a prior end-to-end ileo-colonic anastomosis in the        ascending colon. This was patent and was characterized by healthy        appearing mucosa. The anastomosis was traversed.       A few small-mouthed diverticula were found in the sigmoid colon,        descending colon and transverse colon.       The exam was otherwise without abnormality on direct and retroflexion        views.                                                                                    Impression:            There were only 3 very small aphthous ulcers (1-3mm in                          diameter) at the ileo-colonic anastomosis and in the                          distal 5 cm of the yg-terminal ileum. This is                          consistent with Rutgeert's i1. She has been on                          medication for over a year and almost 2 years. Will                          follow up in clinic. Likely check MRE and if                          unremarkable will continue to monitor off therapy                          given how well she has done.                         - Perianal skin tags found on perianal exam.                         - Simple Endoscopic Score for Crohn's Disease: 2,                          mucosal inflammatory changes secondary to Crohn's                          disease, in remission. Biopsied.                         - Patent end-to-end  ileo-colonic anastomosis,                          c haracterized by healthy appearing mucosa.                         - Diverticulosis in the sigmoid colon, in the                          descending colon and in the transverse colon.                         - The examination was otherwise normal on direct and                          retroflexion views.  Recommendation:        - Discharge patient to home (with escort).                         - Resume previous diet.                         - Continue present medications.                         - Await pathology results.                         - Repeat colonoscopy in 2 years for surveillance.                         - Return to GI clinic as previously scheduled.                                                                                     Electronically Signed by: Dr. Gumaro Magdaleno  ___________________________  GUMARO MAGDALENO MD  2023 9:59:45 AM  I was physically present for the entire viewing portion of the exam.  __________________________  Signature of [facility] physician  GUMARO MAGDALENO MD  Number of Addenda: 0    Note Initiated On: 2023 7:08 AM  Scope In:  Scope Out:     ]    ALLERGIES:   No Known Allergies    PERTINENT MEDICATIONS:    Current Outpatient Medications:     VITAMIN D (CHOLECALCIFEROL) PO, , Disp: , Rfl:     SOCIAL HISTORY:  Social History     Socioeconomic History    Marital status:      Spouse name: Not on file    Number of children: Not on file    Years of education: Not on file    Highest education level: Not on file   Occupational History    Not on file   Tobacco Use    Smoking status: Former     Packs/day: 1.00     Years: 7.00     Pack years: 7.00     Types: Cigarettes     Start date: 2003     Quit date: 2010     Years since quittin.7    Smokeless tobacco: Never    Tobacco comments:     Smoked when I was younger, no longer smoke   Vaping Use    Vaping Use: Never used   Substance and Sexual  Activity    Alcohol use: Not Currently    Drug use: Yes     Types: Marijuana     Comment: Take low doses of marijuana for stroke symptoms    Sexual activity: Yes     Partners: Male     Birth control/protection: Pull-out method, Natural Family Planning   Other Topics Concern    Parent/sibling w/ CABG, MI or angioplasty before 65F 55M? No   Social History Narrative    Not on file     Social Determinants of Health     Financial Resource Strain: Not on file   Food Insecurity: Not on file   Transportation Needs: Not on file   Physical Activity: Not on file   Stress: Not on file   Social Connections: Not on file   Interpersonal Safety: Not on file   Housing Stability: Not on file       FAMILY HISTORY:  Family History   Problem Relation Age of Onset    Anxiety Disorder Mother     Obesity Mother     Diabetes Father     Hypertension Father     Obesity Father     Breast Cancer Maternal Grandmother     Breast Cancer Paternal Grandmother     Anxiety Disorder Brother     Breast Cancer Cousin     Colorectal Cancer No family hx of        Past/family/social history reviewed and no changes    PHYSICAL EXAMINATION:  Constitutional: aaox3, cooperative, pleasant, not dyspneic/diaphoretic, no acute distress  Vitals reviewed: There were no vitals taken for this visit.  Wt:   Wt Readings from Last 2 Encounters:   03/08/23 53.1 kg (117 lb 1.6 oz)   02/28/23 51.3 kg (113 lb)      Eyes: Sclera anicteric/injected  Ears/nose/mouth/throat: Normal oropharynx without ulcers or exudate, mucus membranes moist, hearing intact  Neck: supple, thyroid normal size  CV: No edema  Respiratory: Unlabored breathing  Lymph: No axillary, submandibular, supraclavicular or inguinal lymphadenopathy  Abd:  Nondistended, +bs, no hepatosplenomegaly, nontender, no peritoneal signs  Skin: warm, perfused, no jaundice  Psych: Normal affect  MSK: Normal gait      PERTINENT STUDIES:  Most recent CBC:  Recent Labs   Lab Test 03/08/23  0842 09/22/22  1155   WBC 11.2* 7.9    HGB 13.8 13.8   HCT 42.4 42.4    281     Most recent hepatic panel:  Recent Labs   Lab Test 03/08/23  0842 09/22/22  1253   ALT 13 14   AST 16 18     Most recent creatinine:  Recent Labs   Lab Test 03/08/23  0842 09/22/22  1253   CR 0.80 0.79

## 2023-12-04 ENCOUNTER — APPOINTMENT (OUTPATIENT)
Dept: CT IMAGING | Facility: CLINIC | Age: 38
End: 2023-12-04
Attending: STUDENT IN AN ORGANIZED HEALTH CARE EDUCATION/TRAINING PROGRAM
Payer: COMMERCIAL

## 2023-12-04 ENCOUNTER — HOSPITAL ENCOUNTER (EMERGENCY)
Facility: CLINIC | Age: 38
Discharge: HOME | End: 2023-12-04
Attending: STUDENT IN AN ORGANIZED HEALTH CARE EDUCATION/TRAINING PROGRAM | Admitting: STUDENT IN AN ORGANIZED HEALTH CARE EDUCATION/TRAINING PROGRAM
Payer: COMMERCIAL

## 2023-12-04 VITALS
TEMPERATURE: 97.6 F | BODY MASS INDEX: 21.83 KG/M2 | SYSTOLIC BLOOD PRESSURE: 114 MMHG | DIASTOLIC BLOOD PRESSURE: 77 MMHG | HEIGHT: 61 IN | WEIGHT: 115.6 LBS | OXYGEN SATURATION: 100 % | RESPIRATION RATE: 14 BRPM | HEART RATE: 71 BPM

## 2023-12-04 DIAGNOSIS — R51.9 NONINTRACTABLE HEADACHE, UNSPECIFIED CHRONICITY PATTERN, UNSPECIFIED HEADACHE TYPE: ICD-10-CM

## 2023-12-04 LAB
ANION GAP SERPL CALCULATED.3IONS-SCNC: 6 MMOL/L (ref 7–15)
BASOPHILS # BLD AUTO: 0 10E3/UL (ref 0–0.2)
BASOPHILS NFR BLD AUTO: 0 %
BUN SERPL-MCNC: 10.1 MG/DL (ref 6–20)
CALCIUM SERPL-MCNC: 9.3 MG/DL (ref 8.6–10)
CHLORIDE SERPL-SCNC: 106 MMOL/L (ref 98–107)
CREAT SERPL-MCNC: 0.74 MG/DL (ref 0.51–0.95)
DEPRECATED HCO3 PLAS-SCNC: 28 MMOL/L (ref 22–29)
EGFRCR SERPLBLD CKD-EPI 2021: >90 ML/MIN/1.73M2
EOSINOPHIL # BLD AUTO: 0 10E3/UL (ref 0–0.7)
EOSINOPHIL NFR BLD AUTO: 0 %
ERYTHROCYTE [DISTWIDTH] IN BLOOD BY AUTOMATED COUNT: 12.4 % (ref 10–15)
GLUCOSE SERPL-MCNC: 84 MG/DL (ref 70–99)
HCT VFR BLD AUTO: 39.9 % (ref 35–47)
HGB BLD-MCNC: 13.1 G/DL (ref 11.7–15.7)
IMM GRANULOCYTES # BLD: 0 10E3/UL
IMM GRANULOCYTES NFR BLD: 0 %
LYMPHOCYTES # BLD AUTO: 1.7 10E3/UL (ref 0.8–5.3)
LYMPHOCYTES NFR BLD AUTO: 42 %
MCH RBC QN AUTO: 30.5 PG (ref 26.5–33)
MCHC RBC AUTO-ENTMCNC: 32.8 G/DL (ref 31.5–36.5)
MCV RBC AUTO: 93 FL (ref 78–100)
MONOCYTES # BLD AUTO: 0.2 10E3/UL (ref 0–1.3)
MONOCYTES NFR BLD AUTO: 5 %
NEUTROPHILS # BLD AUTO: 2.2 10E3/UL (ref 1.6–8.3)
NEUTROPHILS NFR BLD AUTO: 53 %
NRBC # BLD AUTO: 0 10E3/UL
NRBC BLD AUTO-RTO: 0 /100
PLATELET # BLD AUTO: 261 10E3/UL (ref 150–450)
POTASSIUM SERPL-SCNC: 4.3 MMOL/L (ref 3.4–5.3)
RBC # BLD AUTO: 4.3 10E6/UL (ref 3.8–5.2)
SODIUM SERPL-SCNC: 140 MMOL/L (ref 135–145)
WBC # BLD AUTO: 4.1 10E3/UL (ref 4–11)

## 2023-12-04 PROCEDURE — 70498 CT ANGIOGRAPHY NECK: CPT

## 2023-12-04 PROCEDURE — 80048 BASIC METABOLIC PNL TOTAL CA: CPT | Performed by: STUDENT IN AN ORGANIZED HEALTH CARE EDUCATION/TRAINING PROGRAM

## 2023-12-04 PROCEDURE — 99285 EMERGENCY DEPT VISIT HI MDM: CPT | Mod: 25

## 2023-12-04 PROCEDURE — 250N000011 HC RX IP 250 OP 636: Mod: JZ | Performed by: STUDENT IN AN ORGANIZED HEALTH CARE EDUCATION/TRAINING PROGRAM

## 2023-12-04 PROCEDURE — 70496 CT ANGIOGRAPHY HEAD: CPT

## 2023-12-04 PROCEDURE — 36415 COLL VENOUS BLD VENIPUNCTURE: CPT | Performed by: STUDENT IN AN ORGANIZED HEALTH CARE EDUCATION/TRAINING PROGRAM

## 2023-12-04 PROCEDURE — 85025 COMPLETE CBC W/AUTO DIFF WBC: CPT | Performed by: STUDENT IN AN ORGANIZED HEALTH CARE EDUCATION/TRAINING PROGRAM

## 2023-12-04 RX ORDER — IOPAMIDOL 755 MG/ML
75 INJECTION, SOLUTION INTRAVASCULAR ONCE
Status: COMPLETED | OUTPATIENT
Start: 2023-12-04 | End: 2023-12-04

## 2023-12-04 RX ADMIN — IOPAMIDOL 75 ML: 755 INJECTION, SOLUTION INTRAVENOUS at 10:21

## 2023-12-04 ASSESSMENT — ACTIVITIES OF DAILY LIVING (ADL)
ADLS_ACUITY_SCORE: 35
ADLS_ACUITY_SCORE: 35

## 2023-12-04 NOTE — ED TRIAGE NOTES
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  Pt reports since Wednesday headaches, bilateral neck pain and upper right arm discomfort.  Pt denies injury or falls. Right ear tinnitus.

## 2023-12-04 NOTE — ED NOTES
Pt complains of on/off headache since Wednesday.  She has a history of stroke with left lower limb changes.  No hx of migraines

## 2023-12-04 NOTE — ED PROVIDER NOTES
Emergency Department Encounter         FINAL IMPRESSION:  Headache          ED COURSE AND MEDICAL DECISION MAKING     8:50 AM I introduced myself to the patient, obtained patient history, performed a physical exam, and discussed plan for ED workup including potential diagnostic laboratory/imaging studies and interventions.  11:16 AM Rechecked and updated the patient. We discussed the plan for discharge and the patient is agreeable. Reviewed supportive cares, symptomatic treatment, outpatient follow up, and reasons to return to the Emergency Department. Patient to be discharged by ED RN.     ED Course as of 12/04/23 1117   Mon Dec 04, 2023   0859 38-year-old history of spontaneous right vert dissection few years ago, here with posterior fullness and headache for the last few days.  Intermittently helps with Tylenol.  Patient states he is concerned as she has not had a headache since her initial event.  No neurodeficits.  States that time her of her diagnosis of dissection, she had severe vertigo and severe headache and states this is not the same.  On arrival she looks well.  Neurologically intact.  Heart and lungs normal.  Has residual left-sided paresthesias intermittently.  Plan for labs CTA reevaluate.  Patient this point is declining any further medications for her headache.         CTA negative.  Patient asymptomatic.  Repeat neuro examination unremarkable.  Plan for discharge home                Medical Decision Making    History:  Supplemental history from: N/A  External Record(s) reviewed: Documented in chart, if applicable.    Work Up:  Chart documentation includes differential considered and any EKGs or imaging independently interpreted by provider, where specified.  In additional to work up documented, I considered the following work up: Documented in chart, if applicable.    External consultation:  Discussion of management with another provider: Documented in chart, if applicable    Complicating  factors:  Care impacted by chronic illness: Cerebrovascular Disease and Other: Crohn's disease  Care affected by social determinants of health: N/A    Disposition considerations: Discharge. No recommendations on prescription strength medication(s). See documentation for any additional details.                Critical Care     Performed by: Chevy Pozo or    Authorized by: Chevy Pozo  Total critical care time:  minutes  Critical care was necessary to treat or prevent imminent or life-threatening deterioration of the following conditions:   Critical care was time spent personally by me on the following activities: development of treatment plan with patient or surrogate, discussions with consultants, examination of patient, evaluation of patient's response to treatment, obtaining history from patient or surrogate, ordering and performing treatments and interventions, ordering and review of laboratory studies, ordering and review of radiographic studies, re-evaluation of patient's condition and monitoring for potential decompensation.  Critical care time was exclusive of separately billable procedures and treating other patients.'    At the conclusion of the encounter I discussed the results of all the tests and the disposition. The questions were answered. The patient or family acknowledged understanding and was agreeable with the care plan.        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    The patient reports she  "has had a few days of an ongoing posterior headache he describes as a fullness. She has been taking Tylenol which intermittently helps the discomfort. She reports she has not had a significant headache since her vertebral artery occlusion at which time she had a severe headache and dizziness. Her current headache does not feel similar to the she had with her occlusion. She denies any focal weakness or numbness.    MEDICAL HISTORY     Past Medical History:   Diagnosis Date    Cerebral infarction (H) 2021       Past Surgical History:   Procedure Laterality Date    COLONOSCOPY  Need one this summer    Crohn s disease    COLONOSCOPY N/A 2023    Procedure: COLONOSCOPY, WITH BIOPSY;  Surgeon: Gumaro Wood MD;  Location: [facility] OR    GI SURGERY  When I was 21    Iliectomy    ORTHOPEDIC SURGERY  When I was 18    Plate in right arm       Social History     Tobacco Use    Smoking status: Former     Packs/day: 1.00     Years: 7.00     Additional pack years: 0.00     Total pack years: 7.00     Types: Cigarettes     Start date: 2003     Quit date: 2010     Years since quittin.9    Smokeless tobacco: Never    Tobacco comments:     Smoked when I was younger, no longer smoke   Vaping Use    Vaping Use: Never used   Substance Use Topics    Alcohol use: Not Currently    Drug use: Yes     Types: Marijuana     Comment: Take low doses of marijuana for stroke symptoms       VITAMIN D (CHOLECALCIFEROL) PO            PHYSICAL EXAM     /77   Pulse 71   Temp 97.6  F (36.4  C) (Oral)   Resp 14   Ht 1.549 m (5' 1\")   Wt 52.4 kg (115 lb 9.6 oz)   LMP 2023   SpO2 100%   BMI 21.84 kg/m        PHYSICAL EXAM:     General: Patient appears well, nontoxic, comfortable  HEENT: Moist mucous membranes,  No head trauma.    Cardiovascular: Normal rate, normal rhythm, no extremity edema.  No appreciable murmur.  Respiratory: No signs of respiratory distress, lungs are clear to auscultation " bilaterally with no wheezes rhonchi or rales.  Abdominal: Soft, nontender, nondistended, no palpable masses, no guarding, no rebound  Musculoskeletal: Full range of motion of joints, no deformities appreciated.  Neurological: Alert and oriented, grossly neurologically intact. Residual left-sided paraesthesias intermittently  Psychological: Normal affect and mood.  Integument: No rashes appreciated      RESULTS       Labs Ordered and Resulted from Time of ED Arrival to Time of ED Departure   BASIC METABOLIC PANEL - Abnormal       Result Value    Sodium 140      Potassium 4.3      Chloride 106      Carbon Dioxide (CO2) 28      Anion Gap 6 (*)     Urea Nitrogen 10.1      Creatinine 0.74      GFR Estimate >90      Calcium 9.3      Glucose 84     CBC WITH PLATELETS AND DIFFERENTIAL    WBC Count 4.1      RBC Count 4.30      Hemoglobin 13.1      Hematocrit 39.9      MCV 93      MCH 30.5      MCHC 32.8      RDW 12.4      Platelet Count 261      % Neutrophils 53      % Lymphocytes 42      % Monocytes 5      % Eosinophils 0      % Basophils 0      % Immature Granulocytes 0      NRBCs per 100 WBC 0      Absolute Neutrophils 2.2      Absolute Lymphocytes 1.7      Absolute Monocytes 0.2      Absolute Eosinophils 0.0      Absolute Basophils 0.0      Absolute Immature Granulocytes 0.0      Absolute NRBCs 0.0         CTA Head Neck with Contrast   Preliminary Result   IMPRESSION:    HEAD CT:   1.  No acute intracranial process.      2.  No change from 01/03/2023 head CT.      HEAD CTA:    1.  No significant stenosis, aneurysm, or high flow vascular malformation identified.      2.  Variant Ute of Carpenter anatomy as above.      3.  Stable appearance from CT angiogram Ute of Carpenter appearance from most recent study 01/03/2023. There is reported history of vertebral arterial dissection on a chronic basis. Stable morphology of the distal vertebral arteries with the left    vertebral artery appearing dominant and supplying the  basilar artery as before. There is a diminutive post PICA right V4 contribution to the basilar artery. No dissection flap is noted on today's study.      4.  No pathologic enhancement intracranially with satisfactory dural venous sinus enhancement.      NECK CTA:   1.  No hemodynamic stenosis or dissection with stable appearance from 01/03/2023 overall.            PROCEDURES:  Procedures:  Procedures       IDaniele am serving as a scribe to document services personally performed by Chevy Pozo DO, based on my observations and the provider's statements to me.  I, Chevy Pozo DO, attest that Daniele Yeung is acting in a scribe capacity, has observed my performance of the services and has documented them in accordance with my direction.    Chevy Pozo DO  Emergency Medicine  [facility] EMERGENCY ROOM     Chevy Pozo DO  12/04/23 1132

## 2023-12-04 NOTE — DISCHARGE INSTRUCTIONS
Your CT today was normal.  No signs of new dissection or any other pathology in your brain or neck.    We placed electronic referral to our neurology/headache clinic.  Should be reaching out to you this week for follow-up/scheduling.    If you have more headaches, please use Tylenol for pain.  If you develop any other new neurologic symptoms including vision changes, difficulty walking, difficulty speaking, weakness, etc. please return to the ER

## 2024-03-07 ENCOUNTER — LAB (OUTPATIENT)
Dept: LAB | Facility: CLINIC | Age: 39
End: 2024-03-07
Payer: COMMERCIAL

## 2024-03-07 ENCOUNTER — OFFICE VISIT (OUTPATIENT)
Dept: GASTROENTEROLOGY | Facility: CLINIC | Age: 39
End: 2024-03-07
Payer: COMMERCIAL

## 2024-03-07 VITALS
OXYGEN SATURATION: 100 % | DIASTOLIC BLOOD PRESSURE: 88 MMHG | SYSTOLIC BLOOD PRESSURE: 118 MMHG | HEIGHT: 62 IN | BODY MASS INDEX: 22.1 KG/M2 | HEART RATE: 61 BPM | WEIGHT: 120.1 LBS

## 2024-03-07 DIAGNOSIS — K50.00 CROHN'S DISEASE OF SMALL INTESTINE WITHOUT COMPLICATION (H): Primary | ICD-10-CM

## 2024-03-07 DIAGNOSIS — K50.00 CROHN'S DISEASE OF SMALL INTESTINE WITHOUT COMPLICATION (H): ICD-10-CM

## 2024-03-07 LAB
ANION GAP SERPL CALCULATED.3IONS-SCNC: 8 MMOL/L (ref 7–15)
BASOPHILS # BLD AUTO: 0 10E3/UL (ref 0–0.2)
BASOPHILS NFR BLD AUTO: 0 %
BUN SERPL-MCNC: 13.3 MG/DL (ref 6–20)
CALCIUM SERPL-MCNC: 9.2 MG/DL (ref 8.6–10)
CHLORIDE SERPL-SCNC: 105 MMOL/L (ref 98–107)
CREAT SERPL-MCNC: 0.75 MG/DL (ref 0.51–0.95)
CRP SERPL-MCNC: <3 MG/L
DEPRECATED HCO3 PLAS-SCNC: 26 MMOL/L (ref 22–29)
EGFRCR SERPLBLD CKD-EPI 2021: >90 ML/MIN/1.73M2
EOSINOPHIL # BLD AUTO: 0 10E3/UL (ref 0–0.7)
EOSINOPHIL NFR BLD AUTO: 0 %
ERYTHROCYTE [DISTWIDTH] IN BLOOD BY AUTOMATED COUNT: 12.5 % (ref 10–15)
ERYTHROCYTE [SEDIMENTATION RATE] IN BLOOD BY WESTERGREN METHOD: 10 MM/HR (ref 0–20)
FERRITIN SERPL-MCNC: 73 NG/ML (ref 6–175)
FOLATE SERPL-MCNC: 9.8 NG/ML (ref 4.6–34.8)
GLUCOSE SERPL-MCNC: 90 MG/DL (ref 70–99)
HCT VFR BLD AUTO: 41.3 % (ref 35–47)
HGB BLD-MCNC: 13.7 G/DL (ref 11.7–15.7)
IMM GRANULOCYTES # BLD: 0 10E3/UL
IMM GRANULOCYTES NFR BLD: 0 %
LYMPHOCYTES # BLD AUTO: 2.2 10E3/UL (ref 0.8–5.3)
LYMPHOCYTES NFR BLD AUTO: 33 %
MCH RBC QN AUTO: 30.5 PG (ref 26.5–33)
MCHC RBC AUTO-ENTMCNC: 33.2 G/DL (ref 31.5–36.5)
MCV RBC AUTO: 92 FL (ref 78–100)
MONOCYTES # BLD AUTO: 0.4 10E3/UL (ref 0–1.3)
MONOCYTES NFR BLD AUTO: 6 %
NEUTROPHILS # BLD AUTO: 4 10E3/UL (ref 1.6–8.3)
NEUTROPHILS NFR BLD AUTO: 61 %
NRBC # BLD AUTO: 0 10E3/UL
NRBC BLD AUTO-RTO: 0 /100
PLATELET # BLD AUTO: 274 10E3/UL (ref 150–450)
POTASSIUM SERPL-SCNC: 4.7 MMOL/L (ref 3.4–5.3)
RBC # BLD AUTO: 4.49 10E6/UL (ref 3.8–5.2)
SODIUM SERPL-SCNC: 139 MMOL/L (ref 135–145)
VIT B12 SERPL-MCNC: 329 PG/ML (ref 232–1245)
VIT D+METAB SERPL-MCNC: 21 NG/ML (ref 20–50)
WBC # BLD AUTO: 6.7 10E3/UL (ref 4–11)

## 2024-03-07 PROCEDURE — 80048 BASIC METABOLIC PNL TOTAL CA: CPT | Performed by: PATHOLOGY

## 2024-03-07 PROCEDURE — 82728 ASSAY OF FERRITIN: CPT | Performed by: PATHOLOGY

## 2024-03-07 PROCEDURE — 99000 SPECIMEN HANDLING OFFICE-LAB: CPT | Performed by: PATHOLOGY

## 2024-03-07 PROCEDURE — 86140 C-REACTIVE PROTEIN: CPT | Performed by: PATHOLOGY

## 2024-03-07 PROCEDURE — 36415 COLL VENOUS BLD VENIPUNCTURE: CPT | Performed by: PATHOLOGY

## 2024-03-07 PROCEDURE — 82746 ASSAY OF FOLIC ACID SERUM: CPT | Performed by: INTERNAL MEDICINE

## 2024-03-07 PROCEDURE — 85652 RBC SED RATE AUTOMATED: CPT | Performed by: PATHOLOGY

## 2024-03-07 PROCEDURE — 99214 OFFICE O/P EST MOD 30 MIN: CPT | Performed by: INTERNAL MEDICINE

## 2024-03-07 PROCEDURE — 82306 VITAMIN D 25 HYDROXY: CPT | Performed by: INTERNAL MEDICINE

## 2024-03-07 PROCEDURE — 85025 COMPLETE CBC W/AUTO DIFF WBC: CPT | Performed by: PATHOLOGY

## 2024-03-07 PROCEDURE — 82607 VITAMIN B-12: CPT | Performed by: INTERNAL MEDICINE

## 2024-03-07 ASSESSMENT — ENCOUNTER SYMPTOMS
DECREASED APPETITE: 0
LOSS OF CONSCIOUSNESS: 0
POLYDIPSIA: 0
POLYPHAGIA: 0
NERVOUS/ANXIOUS: 1
MEMORY LOSS: 0
TINGLING: 1
WEIGHT LOSS: 0
PANIC: 0
FATIGUE: 1
CHILLS: 0
WEIGHT GAIN: 0
SPEECH CHANGE: 0
NIGHT SWEATS: 0
DIZZINESS: 0
HALLUCINATIONS: 0
HEADACHES: 0
PARALYSIS: 0
WEAKNESS: 0
FEVER: 0
DEPRESSION: 0
DECREASED CONCENTRATION: 0
TREMORS: 0
INSOMNIA: 0
SEIZURES: 0
NUMBNESS: 0
INCREASED ENERGY: 0
DISTURBANCES IN COORDINATION: 0
ALTERED TEMPERATURE REGULATION: 0

## 2024-03-07 ASSESSMENT — PAIN SCALES - GENERAL: PAINLEVEL: NO PAIN (0)

## 2024-03-07 NOTE — NURSING NOTE
"Chief Complaint   Patient presents with    Follow Up       Vitals:    03/07/24 0910   BP: 118/88   Pulse: 61   SpO2: 100%   Weight: 54.5 kg (120 lb 1.6 oz)   Height: 1.575 m (5' 2\")       Body mass index is 21.97 kg/m .    [employer]    "

## 2024-03-07 NOTE — PATIENT INSTRUCTIONS
It is good to see you again today. I am so happy you are doing well. Keep up the great work!    Check MRI    Submit stool study and get labs today    If all looks good we can continue to hold off on treatment. If inflammation seen we will discuss therapy    Follow up one year

## 2024-03-07 NOTE — PROGRESS NOTES
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 However, stopped therapy after CVA 11/2021 and has done very well since then. 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 MRE normal with no active inflammation. At that time we discussed treatment versus watchful waiting. She elected watchful waiting and has done well.    -check MRE   -fecal calpro  -routine labs - CBC with diff, LFTs, BMP, CRP  -also check B12/folate, ferritin and vitamin D    If no evidence of active inflammation then continue to monitor off therapy. If active inflammation then discuss biologic therapy    2. Prior B12 deficiency - check vitamin B12 - if low will get on B12 supplements      IBD HISTORY  Age at diagnosis: 2002  Extent of disease: ileal  Disease phenotype: stricturing  Amanda-anal disease: none  Current CD medications: none  Prior IBD surgeries: ileal resection with IC anastomosis  Prior IBD Medications:  -prednisone - remote courses  -6-MP did not control disease  -infliximab - did well - switched for insurance issues - switched to adalimumab (also easier to use - no infusions)  -adalimumab - started 2016 and continued until CVA 11/2021    DRUG MONITORING  TPMT enzyme activity: 32 (normal)    6-TGN/6-MMPN levels: NA    Biologic concentration: unknown    DISEASE ASSESSMENT  Labs  Recent Labs   Lab Test 12/04/23  0937 03/08/23  0842 09/22/22  1253 09/22/22  1155   CRP  --   --  <0.1  --    SED  --  12  --  8   HGB 13.1 13.8  --  13.8     Fecal calprotectin: 10.9 9/2022  Endoscopy: Colonoscopy 3/2023 - No significant inflammation - 2-3 small aphthous ulcers in yg-terminal ileum/anastomosis - rutgeert's i1  Enterography: MRE normal with no active inflammation spring 2023  C diff: none    sIBDQ:   IBDQ Score Date IBDQ - Total Score  (Higher score better)   9/21/2023   7:30  AM 66   9/15/2023   1:47 PM 68   3/1/2023  10:24 AM 65   9/20/2022   9:04 PM 60       IBD Health Care Maintenance:    Vaccinations:  All patients on biologics should avoid live vaccines.    -- Influenza (every year)  -- TdaP (every 10 years)  -- Pneumococcal Pneumonia (once plus booster at 5 years)  -- Yearly assessment for latent Tb (verbal screening and exam, PPD or QuantiFERON-Tb testing)     One time confirmation of immunity or serologies:  -- Hepatitis A (serologies or immunizations)  -- Hepatitis B (serologies or immunizations)  -- Varicella  -- MMR  -- HPV (all aged 18-26)  -- Meningococcal meningitis (all patients at risk for meningitis)  -- ok for live vaccines       Bone mineral density screening   -- Recommend all patients supplement with calcium and vitamin D  -- Given prior steroid use recommend DEXA if not already done - ORDERED 3/7/24     Cancer Screening:  Colon cancer screening:  Given ileal disease, DO NOT recommend patient undergo regular dysplasia surveillance   Next dysplasia screening is recommended NOT NEEDED.     Cervical cancer screening: Per OBGYN     Skin cancer screening: Not needed as not immunosuppressed    Depression Screening:  -- Over the last month, have you felt down, depressed, or hopeless? NO  -- Over the last month, have you felt little interest or pleasure doing things? NO    Misc:  -- Avoid tobacco use  -- Avoid NSAIDs as there is potentially a 25% chance of causing an IBD flare    Return to clinic in 12 months (sooner if need to discuss therapy options)    Thank you for this consultation.  It was a pleasure to participate in the care of this patient; please contact us with any further questions.  I spent a total of 30 minutes during the day of encounter performed chart review, meeting with patient, patient counseling, care coordination, and documentation.      This note was created with voice recognition software, and while reviewed for accuracy, typos may remain.     Gumaro  MD Israel  Division of Gastroenterology, Hepatology and Nutrition  [facility]  Pager: [phone]      HPI:   Currently, here today for routine follow up.    Her year has been great! No GI issues. No symptoms. No diarrhea, pain, blood in stool. No obstructive symptoms.     No EIM.    Is having stress at work. Works for [employer]. They are going through layoffs. She is totally remote and there is push to get back to office - but she does not live near an office.    ROS:    No fevers or chills  No weight loss  No blurry vision, double vision or change in vision  No sore throat  No lymphadenopathy  No headache, paraesthesias, or weakness in a limb  No shortness of breath or wheezing  No chest pain or pressure  No arthralgias or myalgias  No rashes or skin changes  No odynophagia or dysphagia  No BRBPR, hematochezia, melena  No dysuria, frequency or urgency  No hot/cold intolerance or polyria  No anxiety or depression    Extra intestinal manifestations of IBD:  No uveitis/episcleritis  No aphthous ulcers   No arthritis   No erythema nodosum/pyoderma gangrenosum.     PERTINENT PAST MEDICAL HISTORY:  Past Medical History:   Diagnosis Date    Cerebral infarction (H) December 23, 2021    Right vertebral artery dissection       PREVIOUS SURGERIES:  Past Surgical History:   Procedure Laterality Date    COLONOSCOPY  Need one this summer    Crohn s disease    COLONOSCOPY N/A 2/28/2023    Procedure: COLONOSCOPY, WITH BIOPSY;  Surgeon: Gumaro Wood MD;  Location: [facility] OR    GI SURGERY  When I was 21    Iliectomy    ORTHOPEDIC SURGERY  When I was 18    Plate in right arm       PREVIOUS ENDOSCOPY:  Results for orders placed or performed during the hospital encounter of 02/28/23   COLONOSCOPY   Result Value Ref Range    COLONOSCOPY       [facility]  [address] [place] [id number] [phone]     Endoscopy  Department  _______________________________________________________________________________  Patient Name: Macrina Gonzales          Procedure Date: 2/28/2023 7:08 AM  MRN: [id number]                       Account Number: [id number]  YOB: 1985              Admit Type: Outpatient  Age: 38                               Room: [facility] [place], [place] PROCEDURE ROOM 02  Gender: Female                        Note Status: Finalized  Attending MD: EMERALD MAGDALENO MD  Total Sedation Time:   _______________________________________________________________________________     Procedure:             Colonoscopy  Indications:           Disease activity assessment of Crohn's disease of the                          small bowel  Providers:             EMERALD MAGDALENO MD, Carol Zaman, CRNA  Referring MD:          Alea Jerry MD  Requesting Provider:   MIKY Jerry MD  Medicines:             Monitored Anesthesia Care  Complications:         No immediate complications.  _______________________________________________________________________________  Procedure:             Pre-Anesthesia Assessment:                         - See EPIC H and P note                         After obtaining informed consent, the colonoscope was                          passed under direct vision. Throughout the procedure,                          the patient's blood pressure, pulse, and oxygen                          saturations were monitored continuously. The                          Colonoscope was introduced through the anus and                          advanced to the ileocolonic anastomosis. The                          colonoscopy was performed without difficulty. The                          patient tolerated the procedure well. The quality of                          the bowel preparation was evaluated using the BBPS                          ( Bristow Bowel Preparation Scale) with  scores of: Right                          Colon = 3, Transverse Colon = 3 and Left Colon = 3                          (entire mucosa seen well with no residual staining,                          small fragments of stool or opaque liquid). The total                          BBPS score equals 9.                                                                                   Findings:       Skin tags were found on perianal exam.       The Simple Endoscopic Score for Crohn's Disease was determined based on        the endoscopic appearance of the mucosa in the following segments:       - Ileum: Findings include aphthous ulcers less than 0.5 cm in size, less        than 10% ulcerated surfaces, no affected surfaces and no narrowings.        Segment score: 2.       - Right Colon: Findings include no ulcers present, no ulcerated        surfaces, no affected surfaces and no narrowings. Segment score: 0.       - Transverse Colon: Findings include no ulcers p resent, no ulcerated        surfaces, no affected surfaces and no narrowings. Segment score: 0.       - Left Colon: Findings include no ulcers present, no ulcerated surfaces,        no affected surfaces and no narrowings. Segment score: 0.       - Rectum: Findings include no ulcers present, no ulcerated surfaces, no        affected surfaces and no narrowings. Segment score: 0.       - Total SES-CD aggregate score: 2. Biopsies were taken with a cold        forceps for histology of the ileum, right colon, left colon and rectum        (separate jars).       There was evidence of a prior end-to-end ileo-colonic anastomosis in the        ascending colon. This was patent and was characterized by healthy        appearing mucosa. The anastomosis was traversed.       A few small-mouthed diverticula were found in the sigmoid colon,        descending colon and transverse colon.       The exam was otherwise without abnormality on direct and retroflexion        views.                                                                                     Impression:            There were only 3 very small aphthous ulcers (1-3mm in                          diameter) at the ileo-colonic anastomosis and in the                          distal 5 cm of the yg-terminal ileum. This is                          consistent with Rutgeert's i1. She has been on                          medication for over a year and almost 2 years. Will                          follow up in clinic. Likely check MRE and if                          unremarkable will continue to monitor off therapy                          given how well she has done.                         - Perianal skin tags found on perianal exam.                         - Simple Endoscopic Score for Crohn's Disease: 2,                          mucosal inflammatory changes secondary to Crohn's                          disease, in remission. Biopsied.                         - Patent end-to-end ileo-colonic anastomosis,                          c haracterized by healthy appearing mucosa.                         - Diverticulosis in the sigmoid colon, in the                          descending colon and in the transverse colon.                         - The examination was otherwise normal on direct and                          retroflexion views.  Recommendation:        - Discharge patient to home (with escort).                         - Resume previous diet.                         - Continue present medications.                         - Await pathology results.                         - Repeat colonoscopy in 2 years for surveillance.                         - Return to GI clinic as previously scheduled.                                                                                     Electronically Signed by: Dr. Gumaro Magdaleno  ___________________________  GUMARO MAGDALENO MD  2/28/2023 9:59:45 AM  I was physically present for the entire viewing  portion of the exam.  __________________________  Signature of jefry  physician  EMERALD MAGDALENO MD  Number of Addenda: 0    Note Initiated On: 2023 7:08 AM  Scope In:  Scope Out:     ]    ALLERGIES:   No Known Allergies    PERTINENT MEDICATIONS:    Current Outpatient Medications:     VITAMIN D (CHOLECALCIFEROL) PO, , Disp: , Rfl:     SOCIAL HISTORY:  Social History     Socioeconomic History    Marital status:      Spouse name: Not on file    Number of children: Not on file    Years of education: Not on file    Highest education level: Not on file   Occupational History    Not on file   Tobacco Use    Smoking status: Former     Packs/day: 1.00     Years: 7.00     Additional pack years: 0.00     Total pack years: 7.00     Types: Cigarettes     Start date: 2003     Quit date: 2010     Years since quittin.1    Smokeless tobacco: Never    Tobacco comments:     Smoked when I was younger, no longer smoke   Vaping Use    Vaping Use: Never used   Substance and Sexual Activity    Alcohol use: Not Currently    Drug use: Yes     Types: Marijuana     Comment: Take low doses of marijuana for stroke symptoms    Sexual activity: Yes     Partners: Male     Birth control/protection: Pull-out method, Natural Family Planning   Other Topics Concern    Parent/sibling w/ CABG, MI or angioplasty before 65F 55M? No   Social History Narrative    Not on file     Social Determinants of Health     Financial Resource Strain: Not on file   Food Insecurity: Not on file   Transportation Needs: Not on file   Physical Activity: Not on file   Stress: Not on file   Social Connections: Not on file   Interpersonal Safety: Not on file   Housing Stability: Not on file       FAMILY HISTORY:  Family History   Problem Relation Age of Onset    Anxiety Disorder Mother     Obesity Mother     Diabetes Father     Hypertension Father     Obesity Father     Breast Cancer Maternal Grandmother     Breast Cancer Paternal Grandmother      "Anxiety Disorder Brother     Breast Cancer Cousin     Colorectal Cancer No family hx of        Past/family/social history reviewed and no changes    PHYSICAL EXAMINATION:  Constitutional: aaox3, cooperative, pleasant, not dyspneic/diaphoretic, no acute distress  Vitals reviewed: /88   Pulse 61   Ht 1.575 m (5' 2\")   Wt 54.5 kg (120 lb 1.6 oz)   SpO2 100%   BMI 21.97 kg/m    Wt:   Wt Readings from Last 2 Encounters:   03/07/24 54.5 kg (120 lb 1.6 oz)   12/04/23 52.4 kg (115 lb 9.6 oz)      Eyes: Sclera anicteric/injected  Ears/nose/mouth/throat: Normal oropharynx without ulcers or exudate, mucus membranes moist, hearing intact  Neck: supple, thyroid normal size  CV: No edema  Respiratory: Unlabored breathing  Lymph: No axillary, submandibular, supraclavicular or inguinal lymphadenopathy  Abd:  Nondistended, +bs, no hepatosplenomegaly, nontender, no peritoneal signs  Skin: warm, perfused, no jaundice  Psych: Normal affect  MSK: Normal gait      PERTINENT STUDIES:  Most recent CBC:  Recent Labs   Lab Test 12/04/23  0937 03/08/23  0842   WBC 4.1 11.2*   HGB 13.1 13.8   HCT 39.9 42.4    241     Most recent hepatic panel:  Recent Labs   Lab Test 03/08/23  0842 09/22/22  1253   ALT 13 14   AST 16 18     Most recent creatinine:  Recent Labs   Lab Test 12/04/23  0937 03/08/23  0842   CR 0.74 0.80            Answers submitted by the patient for this visit:  Symptoms you have experienced in the last 30 days (Submitted on 3/7/2024)  General Symptoms: Yes  Skin Symptoms: No  HENT Symptoms: No  EYE SYMPTOMS: No  HEART SYMPTOMS: No  LUNG SYMPTOMS: No  INTESTINAL SYMPTOMS: No  URINARY SYMPTOMS: No  GYNECOLOGIC SYMPTOMS: No  BREAST SYMPTOMS: No  SKELETAL SYMPTOMS: No  BLOOD SYMPTOMS: No  NERVOUS SYSTEM SYMPTOMS: Yes  MENTAL HEALTH SYMPTOMS: Yes  Please answer the questions below to tell us what conditions you are experiencing: (Submitted on 3/7/2024)  Fever: No  Loss of appetite: No  Weight loss: No  Weight gain: " No  Fatigue: Yes  Night sweats: No  Chills: No  Increased stress: Yes  Excessive hunger: No  Excessive thirst: No  Feeling hot or cold when others believe the temperature is normal: No  Loss of height: No  Post-operative complications: No  Surgical site pain: No  Hallucinations: No  Change in or Loss of Energy: No  Hyperactivity: No  Confusion: No  Please answer the questions below to tell us what condition you are experiencing: (Submitted on 3/7/2024)  Trouble with coordination: No  Dizziness or trouble with balance: No  Fainting or black-out spells: No  Memory loss: No  Headache: No  Seizures: No  Speech problems: No  Tingling: Yes  Tremor: No  Weakness: No  Difficulty walking: No  Paralysis: No  Numbness: No  Please answer the questions below to tell us what condition you are experiencing: (Submitted on 3/7/2024)  Nervous or Anxious: Yes  Depression: No  Trouble sleeping: No  Trouble thinking or concentrating: No  Mood changes: No  Panic attacks: No

## 2024-03-07 NOTE — LETTER
3/7/2024         RE: Macrina Gonzales  [address]  [facility] [address]        Dear Colleague,    Thank you for referring your patient, Macrina Gonzales, to the [facility] [place]. Please see a copy of my visit note below.    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 However, stopped therapy after CVA 11/2021 and has done very well since then. 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 MRE normal with no active inflammation. At that time we discussed treatment versus watchful waiting. She elected watchful waiting and has done well.    -check MRE   -fecal calpro  -routine labs - CBC with diff, LFTs, BMP, CRP  -also check B12/folate, ferritin and vitamin D    If no evidence of active inflammation then continue to monitor off therapy. If active inflammation then discuss biologic therapy    2. Prior B12 deficiency - check vitamin B12 - if low will get on B12 supplements      IBD HISTORY  Age at diagnosis: 2002  Extent of disease: ileal  Disease phenotype: stricturing  Amanda-anal disease: none  Current CD medications: none  Prior IBD surgeries: ileal resection with IC anastomosis  Prior IBD Medications:  -prednisone - remote courses  -6-MP did not control disease  -infliximab - did well - switched for insurance issues - switched to adalimumab (also easier to use - no infusions)  -adalimumab - started 2016 and continued until CVA 11/2021    DRUG MONITORING  TPMT enzyme activity: 32 (normal)    6-TGN/6-MMPN levels: NA    Biologic concentration: unknown    DISEASE ASSESSMENT  Labs  Recent Labs   Lab Test 12/04/23  0937 03/08/23  0842 09/22/22  1253 09/22/22  1155   CRP  --   --  <0.1  --    SED  --  12  --  8   HGB 13.1 13.8  --  13.8     Fecal calprotectin: 10.9 9/2022  Endoscopy: Colonoscopy 3/2023 - No  significant inflammation - 2-3 small aphthous ulcers in yg-terminal ileum/anastomosis - rutgeert's i1  Enterography: MRE normal with no active inflammation spring 2023  C diff: none    sIBDQ:   IBDQ Score Date IBDQ - Total Score  (Higher score better)   9/21/2023   7:30 AM 66   9/15/2023   1:47 PM 68   3/1/2023  10:24 AM 65   9/20/2022   9:04 PM 60       IBD Health Care Maintenance:    Vaccinations:  All patients on biologics should avoid live vaccines.    -- Influenza (every year)  -- TdaP (every 10 years)  -- Pneumococcal Pneumonia (once plus booster at 5 years)  -- Yearly assessment for latent Tb (verbal screening and exam, PPD or QuantiFERON-Tb testing)     One time confirmation of immunity or serologies:  -- Hepatitis A (serologies or immunizations)  -- Hepatitis B (serologies or immunizations)  -- Varicella  -- MMR  -- HPV (all aged 18-26)  -- Meningococcal meningitis (all patients at risk for meningitis)  -- ok for live vaccines       Bone mineral density screening   -- Recommend all patients supplement with calcium and vitamin D  -- Given prior steroid use recommend DEXA if not already done - ORDERED 3/7/24     Cancer Screening:  Colon cancer screening:  Given ileal disease, DO NOT recommend patient undergo regular dysplasia surveillance   Next dysplasia screening is recommended NOT NEEDED.     Cervical cancer screening: Per OBGYN     Skin cancer screening: Not needed as not immunosuppressed    Depression Screening:  -- Over the last month, have you felt down, depressed, or hopeless? NO  -- Over the last month, have you felt little interest or pleasure doing things? NO    Misc:  -- Avoid tobacco use  -- Avoid NSAIDs as there is potentially a 25% chance of causing an IBD flare    Return to clinic in 12 months (sooner if need to discuss therapy options)    Thank you for this consultation.  It was a pleasure to participate in the care of this patient; please contact us with any further questions.  I spent a  total of 30 minutes during the day of encounter performed chart review, meeting with patient, patient counseling, care coordination, and documentation.      This note was created with voice recognition software, and while reviewed for accuracy, typos may remain.     Gumaro Wood MD  Division of Gastroenterology, Hepatology and Nutrition  [facility]  Pager: [phone]      HPI:   Currently, here today for routine follow up.    Her year has been great! No GI issues. No symptoms. No diarrhea, pain, blood in stool. No obstructive symptoms.     No EIM.    Is having stress at work. Works for [employer]. They are going through layoffs. She is totally remote and there is push to get back to office - but she does not live near an office.    ROS:    No fevers or chills  No weight loss  No blurry vision, double vision or change in vision  No sore throat  No lymphadenopathy  No headache, paraesthesias, or weakness in a limb  No shortness of breath or wheezing  No chest pain or pressure  No arthralgias or myalgias  No rashes or skin changes  No odynophagia or dysphagia  No BRBPR, hematochezia, melena  No dysuria, frequency or urgency  No hot/cold intolerance or polyria  No anxiety or depression    Extra intestinal manifestations of IBD:  No uveitis/episcleritis  No aphthous ulcers   No arthritis   No erythema nodosum/pyoderma gangrenosum.     PERTINENT PAST MEDICAL HISTORY:  Past Medical History:   Diagnosis Date    Cerebral infarction (H) December 23, 2021    Right vertebral artery dissection       PREVIOUS SURGERIES:  Past Surgical History:   Procedure Laterality Date    COLONOSCOPY  Need one this summer    Crohn s disease    COLONOSCOPY N/A 2/28/2023    Procedure: COLONOSCOPY, WITH BIOPSY;  Surgeon: Gumaro Wood MD;  Location: [facility] OR    GI SURGERY  When I was 21    Iliectomy    ORTHOPEDIC SURGERY  When I was 18    Plate in right arm       PREVIOUS ENDOSCOPY:  Results for orders placed or performed  during the hospital encounter of 02/28/23   COLONOSCOPY   Result Value Ref Range    COLONOSCOPY       [facility]  [address] [place], [place] [address]., [place] [address] [phone]     Endoscopy Department  _______________________________________________________________________________  Patient Name: Macrina Gonzales          Procedure Date: 2/28/2023 7:08 AM  MRN: [id number]                       Account Number: [id number]  YOB: 1985              Admit Type: Outpatient  Age: 38                               [place] [facility] [place] [facility]  Gender: Female                        Note Status: Finalized  Attending MD: EMERALD MAGDALENO MD  Total Sedation Time:   _______________________________________________________________________________     Procedure:             Colonoscopy  Indications:           Disease activity assessment of Crohn's disease of the                          small bowel  Providers:             EMERALD MAGDALENO MD, Carol Zaman, CRNA  Referring MD:          Alea Jerry MD  Requesting Provider:   MIKY Jerry MD  Medicines:             Monitored Anesthesia Care  Complications:         No immediate complications.  _______________________________________________________________________________  Procedure:             Pre-Anesthesia Assessment:                         - See EPIC H and P note                         After obtaining informed consent, the colonoscope was                          passed under direct vision. Throughout the procedure,                          the patient's blood pressure, pulse, and oxygen                          saturations were monitored continuously. The                          Colonoscope was introduced through the anus and                          advanced to the ileocolonic anastomosis. The                          colonoscopy was performed without difficulty. The                           patient tolerated the procedure well. The quality of                          the bowel preparation was evaluated using the BBPS                          ( [place] Bowel Preparation Scale) with scores of: Right                          Colon = 3, Transverse Colon = 3 and Left Colon = 3                          (entire mucosa seen well with no residual staining,                          small fragments of stool or opaque liquid). The total                          BBPS score equals 9.                                                                                   Findings:       Skin tags were found on perianal exam.       The Simple Endoscopic Score for Crohn's Disease was determined based on        the endoscopic appearance of the mucosa in the following segments:       - Ileum: Findings include aphthous ulcers less than 0.5 cm in size, less        than 10% ulcerated surfaces, no affected surfaces and no narrowings.        Segment score: 2.       - Right Colon: Findings include no ulcers present, no ulcerated        surfaces, no affected surfaces and no narrowings. Segment score: 0.       - Transverse Colon: Findings include no ulcers p resent, no ulcerated        surfaces, no affected surfaces and no narrowings. Segment score: 0.       - Left Colon: Findings include no ulcers present, no ulcerated surfaces,        no affected surfaces and no narrowings. Segment score: 0.       - Rectum: Findings include no ulcers present, no ulcerated surfaces, no        affected surfaces and no narrowings. Segment score: 0.       - Total SES-CD aggregate score: 2. Biopsies were taken with a cold        forceps for histology of the ileum, right colon, left colon and rectum        (separate jars).       There was evidence of a prior end-to-end ileo-colonic anastomosis in the        ascending colon. This was patent and was characterized by healthy        appearing mucosa. The anastomosis was traversed.       A few small-mouthed  diverticula were found in the sigmoid colon,        descending colon and transverse colon.       The exam was otherwise without abnormality on direct and retroflexion        views.                                                                                    Impression:            There were only 3 very small aphthous ulcers (1-3mm in                          diameter) at the ileo-colonic anastomosis and in the                          distal 5 cm of the yg-terminal ileum. This is                          consistent with Rutgeert's i1. She has been on                          medication for over a year and almost 2 years. Will                          follow up in clinic. Likely check MRE and if                          unremarkable will continue to monitor off therapy                          given how well she has done.                         - Perianal skin tags found on perianal exam.                         - Simple Endoscopic Score for Crohn's Disease: 2,                          mucosal inflammatory changes secondary to Crohn's                          disease, in remission. Biopsied.                         - Patent end-to-end ileo-colonic anastomosis,                          c haracterized by healthy appearing mucosa.                         - Diverticulosis in the sigmoid colon, in the                          descending colon and in the transverse colon.                         - The examination was otherwise normal on direct and                          retroflexion views.  Recommendation:        - Discharge patient to home (with escort).                         - Resume previous diet.                         - Continue present medications.                         - Await pathology results.                         - Repeat colonoscopy in 2 years for surveillance.                         - Return to GI clinic as previously scheduled.                                                                                      Electronically Signed by: Dr. Gumaro Magdaleno  ___________________________  GUMARO MAGDALENO MD  2023 9:59:45 AM  I was physically present for the entire viewing portion of the exam.  __________________________  Signature of [facility] physician  GUMARO MAGDALENO MD  Number of Addenda: 0    Note Initiated On: 2023 7:08 AM  Scope In:  Scope Out:     ]    ALLERGIES:   No Known Allergies    PERTINENT MEDICATIONS:    Current Outpatient Medications:     VITAMIN D (CHOLECALCIFEROL) PO, , Disp: , Rfl:     SOCIAL HISTORY:  Social History     Socioeconomic History    Marital status:      Spouse name: Not on file    Number of children: Not on file    Years of education: Not on file    Highest education level: Not on file   Occupational History    Not on file   Tobacco Use    Smoking status: Former     Packs/day: 1.00     Years: 7.00     Additional pack years: 0.00     Total pack years: 7.00     Types: Cigarettes     Start date: 2003     Quit date: 2010     Years since quittin.1    Smokeless tobacco: Never    Tobacco comments:     Smoked when I was younger, no longer smoke   Vaping Use    Vaping Use: Never used   Substance and Sexual Activity    Alcohol use: Not Currently    Drug use: Yes     Types: Marijuana     Comment: Take low doses of marijuana for stroke symptoms    Sexual activity: Yes     Partners: Male     Birth control/protection: Pull-out method, Natural Family Planning   Other Topics Concern    Parent/sibling w/ CABG, MI or angioplasty before 65F 55M? No   Social History Narrative    Not on file     Social Determinants of Health     Financial Resource Strain: Not on file   Food Insecurity: Not on file   Transportation Needs: Not on file   Physical Activity: Not on file   Stress: Not on file   Social Connections: Not on file   Interpersonal Safety: Not on file   Housing Stability: Not on file       FAMILY HISTORY:  Family History   Problem Relation Age of Onset  "   Anxiety Disorder Mother     Obesity Mother     Diabetes Father     Hypertension Father     Obesity Father     Breast Cancer Maternal Grandmother     Breast Cancer Paternal Grandmother     Anxiety Disorder Brother     Breast Cancer Cousin     Colorectal Cancer No family hx of        Past/family/social history reviewed and no changes    PHYSICAL EXAMINATION:  Constitutional: aaox3, cooperative, pleasant, not dyspneic/diaphoretic, no acute distress  Vitals reviewed: /88   Pulse 61   Ht 1.575 m (5' 2\")   Wt 54.5 kg (120 lb 1.6 oz)   SpO2 100%   BMI 21.97 kg/m    Wt:   Wt Readings from Last 2 Encounters:   03/07/24 54.5 kg (120 lb 1.6 oz)   12/04/23 52.4 kg (115 lb 9.6 oz)      Eyes: Sclera anicteric/injected  Ears/nose/mouth/throat: Normal oropharynx without ulcers or exudate, mucus membranes moist, hearing intact  Neck: supple, thyroid normal size  CV: No edema  Respiratory: Unlabored breathing  Lymph: No axillary, submandibular, supraclavicular or inguinal lymphadenopathy  Abd:  Nondistended, +bs, no hepatosplenomegaly, nontender, no peritoneal signs  Skin: warm, perfused, no jaundice  Psych: Normal affect  MSK: Normal gait      PERTINENT STUDIES:  Most recent CBC:  Recent Labs   Lab Test 12/04/23  0937 03/08/23  0842   WBC 4.1 11.2*   HGB 13.1 13.8   HCT 39.9 42.4    241     Most recent hepatic panel:  Recent Labs   Lab Test 03/08/23  0842 09/22/22  1253   ALT 13 14   AST 16 18     Most recent creatinine:  Recent Labs   Lab Test 12/04/23  0937 03/08/23  0842   CR 0.74 0.80            Answers submitted by the patient for this visit:  Symptoms you have experienced in the last 30 days (Submitted on 3/7/2024)  General Symptoms: Yes  Skin Symptoms: No  HENT Symptoms: No  EYE SYMPTOMS: No  HEART SYMPTOMS: No  LUNG SYMPTOMS: No  INTESTINAL SYMPTOMS: No  URINARY SYMPTOMS: No  GYNECOLOGIC SYMPTOMS: No  BREAST SYMPTOMS: No  SKELETAL SYMPTOMS: No  BLOOD SYMPTOMS: No  NERVOUS SYSTEM SYMPTOMS: Yes  MENTAL " HEALTH SYMPTOMS: Yes  Please answer the questions below to tell us what conditions you are experiencing: (Submitted on 3/7/2024)  Fever: No  Loss of appetite: No  Weight loss: No  Weight gain: No  Fatigue: Yes  Night sweats: No  Chills: No  Increased stress: Yes  Excessive hunger: No  Excessive thirst: No  Feeling hot or cold when others believe the temperature is normal: No  Loss of height: No  Post-operative complications: No  Surgical site pain: No  Hallucinations: No  Change in or Loss of Energy: No  Hyperactivity: No  Confusion: No  Please answer the questions below to tell us what condition you are experiencing: (Submitted on 3/7/2024)  Trouble with coordination: No  Dizziness or trouble with balance: No  Fainting or black-out spells: No  Memory loss: No  Headache: No  Seizures: No  Speech problems: No  Tingling: Yes  Tremor: No  Weakness: No  Difficulty walking: No  Paralysis: No  Numbness: No  Please answer the questions below to tell us what condition you are experiencing: (Submitted on 3/7/2024)  Nervous or Anxious: Yes  Depression: No  Trouble sleeping: No  Trouble thinking or concentrating: No  Mood changes: No  Panic attacks: No        Again, thank you for allowing me to participate in the care of your patient.      Sincerely,    Gumaro Wood MD

## 2024-03-17 ENCOUNTER — OFFICE VISIT (OUTPATIENT)
Dept: FAMILY MEDICINE | Facility: CLINIC | Age: 39
End: 2024-03-17
Payer: COMMERCIAL

## 2024-03-17 VITALS
DIASTOLIC BLOOD PRESSURE: 88 MMHG | SYSTOLIC BLOOD PRESSURE: 125 MMHG | TEMPERATURE: 97.7 F | RESPIRATION RATE: 16 BRPM | WEIGHT: 118 LBS | HEART RATE: 76 BPM | OXYGEN SATURATION: 100 % | BODY MASS INDEX: 21.58 KG/M2

## 2024-03-17 DIAGNOSIS — J11.1 INFLUENZA-LIKE ILLNESS: Primary | ICD-10-CM

## 2024-03-17 DIAGNOSIS — Z20.828 EXPOSURE TO INFLUENZA: ICD-10-CM

## 2024-03-17 LAB
FLUAV AG SPEC QL IA: NEGATIVE
FLUBV AG SPEC QL IA: NEGATIVE

## 2024-03-17 PROCEDURE — 87804 INFLUENZA ASSAY W/OPTIC: CPT | Performed by: FAMILY MEDICINE

## 2024-03-17 PROCEDURE — 99214 OFFICE O/P EST MOD 30 MIN: CPT | Performed by: FAMILY MEDICINE

## 2024-03-17 RX ORDER — OSELTAMIVIR PHOSPHATE 75 MG/1
75 CAPSULE ORAL 2 TIMES DAILY
Qty: 10 CAPSULE | Refills: 0 | Status: SHIPPED | OUTPATIENT
Start: 2024-03-17 | End: 2024-03-22

## 2024-04-19 ENCOUNTER — ANCILLARY PROCEDURE (OUTPATIENT)
Dept: MRI IMAGING | Facility: CLINIC | Age: 39
End: 2024-04-19
Attending: INTERNAL MEDICINE
Payer: COMMERCIAL

## 2024-04-19 ENCOUNTER — ANCILLARY PROCEDURE (OUTPATIENT)
Dept: BONE DENSITY | Facility: CLINIC | Age: 39
End: 2024-04-19
Attending: INTERNAL MEDICINE
Payer: COMMERCIAL

## 2024-04-19 DIAGNOSIS — K50.00 CROHN'S DISEASE OF SMALL INTESTINE WITHOUT COMPLICATION (H): ICD-10-CM

## 2024-04-19 PROCEDURE — 77080 DXA BONE DENSITY AXIAL: CPT

## 2024-04-19 PROCEDURE — 72197 MRI PELVIS W/O & W/DYE: CPT | Performed by: RADIOLOGY

## 2024-04-19 PROCEDURE — 74183 MRI ABD W/O CNTR FLWD CNTR: CPT | Performed by: RADIOLOGY

## 2024-04-19 PROCEDURE — 99000 SPECIMEN HANDLING OFFICE-LAB: CPT | Performed by: PATHOLOGY

## 2024-04-19 PROCEDURE — 83993 ASSAY FOR CALPROTECTIN FECAL: CPT | Performed by: INTERNAL MEDICINE

## 2024-04-19 PROCEDURE — A9585 GADOBUTROL INJECTION: HCPCS | Performed by: RADIOLOGY

## 2024-04-19 RX ORDER — GADOBUTROL 604.72 MG/ML
7.5 INJECTION INTRAVENOUS ONCE
Status: COMPLETED | OUTPATIENT
Start: 2024-04-19 | End: 2024-04-19

## 2024-04-19 RX ADMIN — GADOBUTROL 5.5 ML: 604.72 INJECTION INTRAVENOUS at 14:52

## 2024-04-19 NOTE — DISCHARGE INSTRUCTIONS
MRI Contrast Discharge Instructions    The IV contrast you received today will pass out of your body in your  urine. This will happen in the next 24 hours. You will not feel this process.  Your urine will not change color.    Drink at least 4 extra glasses of water or juice today (unless your doctor  has restricted your fluids). This reduces the stress on your kidneys.  You may take your regular medicines.    If you are on dialysis: It is best to have dialysis today.    If you have a reaction: Most reactions happen right away. If you have  any new symptoms after leaving the hospital (such as hives or swelling),  call your hospital at the correct number below. Or call your family doctor.  If you have breathing distress or wheezing, call [phone].    Special instructions: ***    I have read and understand the above information.    Signature:______________________________________ Date:___________    Staff:__________________________________________ Date:___________     Time:__________    [place] [facility] Departments:    ___[place]: [phone]  ___[facility]: [phone]  ___[place]: [phone] ___[facility]: [phone]  ___[facility]: [phone]  ___[facility]: [phone]  ___[phone]  ___[facility]: [phone]  ___[phone]

## 2024-04-23 LAB — CALPROTECTIN STL-MCNT: 19.1 MG/KG (ref 0–49.9)

## 2024-10-06 ENCOUNTER — HEALTH MAINTENANCE LETTER (OUTPATIENT)
Age: 39
End: 2024-10-06

## 2024-12-04 ENCOUNTER — OFFICE VISIT (OUTPATIENT)
Dept: FAMILY MEDICINE | Facility: CLINIC | Age: 39
End: 2024-12-04
Payer: COMMERCIAL

## 2024-12-04 VITALS
BODY MASS INDEX: 21.14 KG/M2 | SYSTOLIC BLOOD PRESSURE: 116 MMHG | RESPIRATION RATE: 16 BRPM | HEIGHT: 61 IN | OXYGEN SATURATION: 99 % | HEART RATE: 86 BPM | TEMPERATURE: 99 F | DIASTOLIC BLOOD PRESSURE: 76 MMHG | WEIGHT: 112 LBS

## 2024-12-04 DIAGNOSIS — N63.21 MASS OF UPPER OUTER QUADRANT OF LEFT BREAST: ICD-10-CM

## 2024-12-04 DIAGNOSIS — Z00.00 ROUTINE GENERAL MEDICAL EXAMINATION AT A HEALTH CARE FACILITY: Primary | ICD-10-CM

## 2024-12-04 DIAGNOSIS — F41.9 ANXIOUS MOOD: ICD-10-CM

## 2024-12-04 DIAGNOSIS — R20.2 PARESTHESIAS: ICD-10-CM

## 2024-12-04 DIAGNOSIS — N92.1 METRORRHAGIA: ICD-10-CM

## 2024-12-04 DIAGNOSIS — K50.019 CROHN'S DISEASE OF SMALL INTESTINE WITH COMPLICATION (H): ICD-10-CM

## 2024-12-04 PROBLEM — I77.74 VERTEBRAL ARTERY DISSECTION (H): Status: RESOLVED | Noted: 2022-07-11 | Resolved: 2024-12-04

## 2024-12-04 PROBLEM — I63.211: Status: RESOLVED | Noted: 2021-12-24 | Resolved: 2024-12-04

## 2024-12-04 PROCEDURE — 90746 HEPB VACCINE 3 DOSE ADULT IM: CPT | Performed by: FAMILY MEDICINE

## 2024-12-04 PROCEDURE — 99395 PREV VISIT EST AGE 18-39: CPT | Mod: 25 | Performed by: FAMILY MEDICINE

## 2024-12-04 PROCEDURE — 99214 OFFICE O/P EST MOD 30 MIN: CPT | Mod: 25 | Performed by: FAMILY MEDICINE

## 2024-12-04 PROCEDURE — 90471 IMMUNIZATION ADMIN: CPT | Performed by: FAMILY MEDICINE

## 2024-12-04 SDOH — HEALTH STABILITY: PHYSICAL HEALTH: ON AVERAGE, HOW MANY DAYS PER WEEK DO YOU ENGAGE IN MODERATE TO STRENUOUS EXERCISE (LIKE A BRISK WALK)?: 6 DAYS

## 2024-12-04 ASSESSMENT — ANXIETY QUESTIONNAIRES
GAD7 TOTAL SCORE: 6
5. BEING SO RESTLESS THAT IT IS HARD TO SIT STILL: SEVERAL DAYS
7. FEELING AFRAID AS IF SOMETHING AWFUL MIGHT HAPPEN: NOT AT ALL
2. NOT BEING ABLE TO STOP OR CONTROL WORRYING: SEVERAL DAYS
3. WORRYING TOO MUCH ABOUT DIFFERENT THINGS: SEVERAL DAYS
7. FEELING AFRAID AS IF SOMETHING AWFUL MIGHT HAPPEN: NOT AT ALL
GAD7 TOTAL SCORE: 6
1. FEELING NERVOUS, ANXIOUS, OR ON EDGE: SEVERAL DAYS
GAD7 TOTAL SCORE: 6
IF YOU CHECKED OFF ANY PROBLEMS ON THIS QUESTIONNAIRE, HOW DIFFICULT HAVE THESE PROBLEMS MADE IT FOR YOU TO DO YOUR WORK, TAKE CARE OF THINGS AT HOME, OR GET ALONG WITH OTHER PEOPLE: NOT DIFFICULT AT ALL
4. TROUBLE RELAXING: SEVERAL DAYS
6. BECOMING EASILY ANNOYED OR IRRITABLE: SEVERAL DAYS
8. IF YOU CHECKED OFF ANY PROBLEMS, HOW DIFFICULT HAVE THESE MADE IT FOR YOU TO DO YOUR WORK, TAKE CARE OF THINGS AT HOME, OR GET ALONG WITH OTHER PEOPLE?: NOT DIFFICULT AT ALL

## 2024-12-04 ASSESSMENT — PATIENT HEALTH QUESTIONNAIRE - PHQ9
SUM OF ALL RESPONSES TO PHQ QUESTIONS 1-9: 3
10. IF YOU CHECKED OFF ANY PROBLEMS, HOW DIFFICULT HAVE THESE PROBLEMS MADE IT FOR YOU TO DO YOUR WORK, TAKE CARE OF THINGS AT HOME, OR GET ALONG WITH OTHER PEOPLE: NOT DIFFICULT AT ALL
SUM OF ALL RESPONSES TO PHQ QUESTIONS 1-9: 3

## 2024-12-04 ASSESSMENT — SOCIAL DETERMINANTS OF HEALTH (SDOH): HOW OFTEN DO YOU GET TOGETHER WITH FRIENDS OR RELATIVES?: TWICE A WEEK

## 2024-12-04 NOTE — PATIENT INSTRUCTIONS
Patient Education   Preventive Care Advice   This is general advice given by our system to help you stay healthy. However, your care team may have specific advice just for you. Please talk to your care team about your preventive care needs.  Nutrition  Eat 5 or more servings of fruits and vegetables each day.  Try wheat bread, brown rice and whole grain pasta (instead of white bread, rice, and pasta).  Get enough calcium and vitamin D. Check the label on foods and aim for 100% of the RDA (recommended daily allowance).  Lifestyle  Exercise at least 150 minutes each week  (30 minutes a day, 5 days a week).  Do muscle strengthening activities 2 days a week. These help control your weight and prevent disease.  No smoking.  Wear sunscreen to prevent skin cancer.  Have a dental exam and cleaning every 6 months.  Yearly exams  See your health care team every year to talk about:  Any changes in your health.  Any medicines your care team has prescribed.  Preventive care, family planning, and ways to prevent chronic diseases.  Shots (vaccines)   HPV shots (up to age 26), if you've never had them before.  Hepatitis B shots (up to age 59), if you've never had them before.  COVID-19 shot: Get this shot when it's due.  Flu shot: Get a flu shot every year.  Tetanus shot: Get a tetanus shot every 10 years.  Pneumococcal, hepatitis A, and RSV shots: Ask your care team if you need these based on your risk.  Shingles shot (for age 50 and up)  General health tests  Diabetes screening:  Starting at age 35, Get screened for diabetes at least every 3 years.  If you are younger than age 35, ask your care team if you should be screened for diabetes.  Cholesterol test: At age 39, start having a cholesterol test every 5 years, or more often if advised.  Bone density scan (DEXA): At age 50, ask your care team if you should have this scan for osteoporosis (brittle bones).  Hepatitis C: Get tested at least once in your life.  STIs (sexually  transmitted infections)  Before age 24: Ask your care team if you should be screened for STIs.  After age 24: Get screened for STIs if you're at risk. You are at risk for STIs (including HIV) if:  You are sexually active with more than one person.  You don't use condoms every time.  You or a partner was diagnosed with a sexually transmitted infection.  If you are at risk for HIV, ask about PrEP medicine to prevent HIV.  Get tested for HIV at least once in your life, whether you are at risk for HIV or not.  Cancer screening tests  Cervical cancer screening: If you have a cervix, begin getting regular cervical cancer screening tests starting at age 21.  Breast cancer scan (mammogram): If you've ever had breasts, begin having regular mammograms starting at age 40. This is a scan to check for breast cancer.  Colon cancer screening: It is important to start screening for colon cancer at age 45.  Have a colonoscopy test every 10 years (or more often if you're at risk) Or, ask your provider about stool tests like a FIT test every year or Cologuard test every 3 years.  To learn more about your testing options, visit:   .  For help making a decision, visit:   https://bit.ly/dc66607.  Prostate cancer screening test: If you have a prostate, ask your care team if a prostate cancer screening test (PSA) at age 55 is right for you.  Lung cancer screening: If you are a current or former smoker ages 50 to 80, ask your care team if ongoing lung cancer screenings are right for you.  For informational purposes only. Not to replace the advice of your health care provider. Copyright   2023 [facility]. All rights reserved. Clinically reviewed by the [facility]. [employer] [id number] - REV 01/24.  Learning About Stress  What is stress?     Stress is your body's response to a hard situation. Your body can have a physical, emotional, or mental response. Stress is a fact of life for most people, and it  affects everyone differently. What causes stress for you may not be stressful for someone else.  A lot of things can cause stress. You may feel stress when you go on a job interview, take a test, or run a race. This kind of short-term stress is normal and even useful. It can help you if you need to work hard or react quickly. For example, stress can help you finish an important job on time.  Long-term stress is caused by ongoing stressful situations or events. Examples of long-term stress include long-term health problems, ongoing problems at work, or conflicts in your family. Long-term stress can harm your health.  How does stress affect your health?  When you are stressed, your body responds as though you are in danger. It makes hormones that speed up your heart, make you breathe faster, and give you a burst of energy. This is called the fight-or-flight stress response. If the stress is over quickly, your body goes back to normal and no harm is done.  But if stress happens too often or lasts too long, it can have bad effects. Long-term stress can make you more likely to get sick, and it can make symptoms of some diseases worse. If you tense up when you are stressed, you may develop neck, shoulder, or low back pain. Stress is linked to high blood pressure and heart disease.  Stress also harms your emotional health. It can make you avalos, tense, or depressed. Your relationships may suffer, and you may not do well at work or school.  What can you do to manage stress?  You can try these things to help manage stress:   Do something active. Exercise or activity can help reduce stress. Walking is a great way to get started. Even everyday activities such as housecleaning or yard work can help.  Try yoga or lucy chi. These techniques combine exercise and meditation. You may need some training at first to learn them.  Do something you enjoy. For example, listen to music or go to a movie. Practice your hobby or do volunteer  "work.  Meditate. This can help you relax, because you are not worrying about what happened before or what may happen in the future.  Do guided imagery. Imagine yourself in any setting that helps you feel calm. You can use online videos, books, or a teacher to guide you.  Do breathing exercises. For example:  From a standing position, bend forward from the waist with your knees slightly bent. Let your arms dangle close to the floor.  Breathe in slowly and deeply as you return to a standing position. Roll up slowly and lift your head last.  Hold your breath for just a few seconds in the standing position.  Breathe out slowly and bend forward from the waist.  Let your feelings out. Talk, laugh, cry, and express anger when you need to. Talking with supportive friends or family, a counselor, or a guy leader about your feelings is a healthy way to relieve stress. Avoid discussing your feelings with people who make you feel worse.  Write. It may help to write about things that are bothering you. This helps you find out how much stress you feel and what is causing it. When you know this, you can find better ways to cope.  What can you do to prevent stress?  You might try some of these things to help prevent stress:  Manage your time. This helps you find time to do the things you want and need to do.  Get enough sleep. Your body recovers from the stresses of the day while you are sleeping.  Get support. Your family, friends, and community can make a difference in how you experience stress.  Limit your news feed. Avoid or limit time on social media or news that may make you feel stressed.  Do something active. Exercise or activity can help reduce stress. Walking is a great way to get started.  Where can you learn more?  Go to https://www.[place].com.net/patiented  Enter N032 in the search box to learn more about \"Learning About Stress.\"  Current as of: October 24, 2023  Content Version: 14.2 2024 [employer]. "   Care instructions adapted under license by your healthcare professional. If you have questions about a medical condition or this instruction, always ask your healthcare professional. [employer] disclaims any warranty or liability for your use of this information.

## 2024-12-04 NOTE — PROGRESS NOTES
Preventive Care Visit  [facility] [place] RUDYYOSEF Jerry MD, Family Medicine  Dec 4, 2024      Assessment & Plan     Routine general medical examination at a health care facility  Routine history and physical, updated in EMR.  Pap smear is up-to-date, will be due in 2025.  Immunizations updated today.  Colon cancer screening is up-to-date, managed by GI.  Fasting labs deferred for future visit.  Plan repeat physical in 1 year.    Crohn's disease of small intestine with complication (H)  Patient follows closely with GI.  Her symptoms have been stable.  She is currently being monitored off therapy.    Paresthesias  These have improved slowly, might be stable now.  Overall symptoms are relatively mild, worsen with stress.  Patient has cut back on use of marijuana for this issue.    Anxious mood  Discussed options including re-establishing care with a therapist versus trial of medication.  Patient wishes to consider these options further and will let me know what she decides.    Metrorrhagia  Recommended excluding a structural cause with pelvic ultrasound.  She will likely need an endometrial biopsy for further evaluation.  - US Pelvic Complete with Transvaginal; Future    Mass of upper outer quadrant of left breast  This feels linear, possibly a calcified lacrimal duct.  Recommended further evaluation with breast imaging as below.  - MA Diagnostic Left w/ Praful; Future  - US Breast Left Limited 1-3 Quadrants; Future            Counseling  Appropriate preventive services were addressed with this patient via screening, questionnaire, or discussion as appropriate for fall prevention, nutrition, physical activity, Tobacco-use cessation, social engagement, weight loss and cognition.  Checklist reviewing preventive services available has been given to the patient.  Reviewed patient's diet, addressing concerns and/or questions.   She is at risk for psychosocial distress and has been provided with information to  reduce risk.           Subjective   Macrina is a 39 year old, presenting for the following:  Physical and MH Follow Up        12/4/2024    10:10 AM   Additional Questions   Roomed by DAVID Pepe   Accompanied by alone          HPI    Saw Neurology after establishing care with me in 2022.  Follow-up CTA was normal without evidence of previous dissection, so no further antiplatelet agent was needed.  Mother had genetic testing done for breast cancer, was negative for genetic predisposition to cancer.  Periods have changed over the past year, happening now every 3 weeks, sometimes spotting after 2 weeks.  Menses are lasting a bit longer also, can last a week.  Three to four days before periods, seems more irritable.  Feels a lot of stress at home caring for her young child.  Borders on anxiety.  Has seen a therapist in the past, sometimes seems helpful, sometimes doesn't.  Was on medication for mood as a teenager.  Thinks she tried Lexapro and maybe one other agent.        Health Care Directive  Patient does not have a Health Care Directive: Discussed advance care planning with patient; information given to patient to review.      12/4/2024   General Health   How would you rate your overall physical health? Good   Feel stress (tense, anxious, or unable to sleep) To some extent      (!) STRESS CONCERN      12/4/2024   Nutrition   Three or more servings of calcium each day? Yes   Diet: Regular (no restrictions)   How many servings of fruit and vegetables per day? 4 or more   How many sweetened beverages each day? 0-1            12/4/2024   Exercise   Days per week of moderate/strenous exercise 6 days            12/4/2024   Social Factors   Frequency of gathering with friends or relatives Twice a week   Worry food won't last until get money to buy more No   Food not last or not have enough money for food? No   Do you have housing? (Housing is defined as stable permanent housing and does not include staying ouside in a  car, in a tent, in an abandoned building, in an overnight shelter, or couch-surfing.) Yes   Are you worried about losing your housing? No   Lack of transportation? No   Unable to get utilities (heat,electricity)? No            2024   Dental   Dentist two times every year? Yes             Today's PHQ-9 Score:       2024     9:52 AM   PHQ-9 SCORE   PHQ-9 Total Score MyChart 3 (Minimal depression)   PHQ-9 Total Score 3        Patient-reported         2024   Substance Use   Alcohol more than 3/day or more than 7/wk No   Do you use any other substances recreationally? No        Social History     Tobacco Use    Smoking status: Former     Current packs/day: 0.00     Average packs/day: 1 pack/day for 7.0 years (7.0 ttl pk-yrs)     Types: Cigarettes     Start date: 2003     Quit date: 2010     Years since quittin.9     Passive exposure: Never    Smokeless tobacco: Never    Tobacco comments:     Smoked when I was younger, no longer smoke   Vaping Use    Vaping status: Never Used   Substance Use Topics    Alcohol use: Not Currently    Drug use: Yes     Types: Marijuana     Comment: Take low doses of marijuana for stroke symptoms           2022   LAST FHS-7 RESULTS   1st degree relative breast or ovarian cancer Unknown    Any relative bilateral breast cancer Unknown    Any male have breast cancer No    Any ONE woman have BOTH breast AND ovarian cancer Yes    Any woman with breast cancer before 50yrs Yes    2 or more relatives with breast AND/OR ovarian cancer Yes    2 or more relatives with breast AND/OR bowel cancer Yes        Patient-reported       Mammogram Screening - Patient under 40 years of age: Routine Mammogram Screening not recommended.           2024   One time HIV Screening   Previous HIV test? No          2024   STI Screening   New sexual partner(s) since last STI/HIV test? No        History of abnormal Pap smear: No, but recommended to repeat in 3 years        Latest Ref  Rng & Units 2022     3:18 PM   PAP / HPV   PAP  Negative for Intraepithelial Lesion or Malignancy (NILM)    HPV 16 DNA Negative Negative    HPV 18 DNA Negative Negative    Other HR HPV Negative Negative            2024   Contraception/Family Planning   Questions about contraception or family planning (!) YES - thinks done having kids.           Reviewed and updated as needed this visit by Provider   Tobacco  Allergies  Meds  Problems  Med Hx  Surg Hx  Fam Hx  Soc   Hx Sexual Activity          Past Medical History:   Diagnosis Date    Cerebral infarction (H) 2021    Right vertebral artery dissection    Cerebrovascular accident (CVA) due to occlusion of right vertebral artery (H) 2021    Formatting of this note is different from the original.  2021 discharge -clopidogrel 75mg daily. ASA switched to 81mg QD on discharge. Per neuro will need to continue DAPT for 3-6 months. Will need stroke clinic follow-up in ~6 weeks and repeat imaging in three months.     Neurology  -Recommendations:  1) Further neurodiagnostic work up:   - Repeat CTA head and neck in 2022.   - Ob    Vertebral artery dissection (H) 2022     Past Surgical History:   Procedure Laterality Date    COLONOSCOPY  Need one this summer    Crohn s disease    COLONOSCOPY N/A 2023    Procedure: COLONOSCOPY, WITH BIOPSY;  Surgeon: Gumaro Wood MD;  Location: [facility] OR    GI SURGERY  When I was 21    Iliectomy    ORTHOPEDIC SURGERY  When I was 18    Plate in right arm     OB History    Para Term  AB Living   1 1 1 0 0 1   SAB IAB Ectopic Multiple Live Births   0 0 0 0 1      # Outcome Date GA Lbr Darren/2nd Weight Sex Type Anes PTL Lv   1 Term 23 41w1d 04:42 / 05:28 3.175 kg (7 lb) M Vag-Spont EPI  KRYS      Name: SHAINA WALKER      Apgar1: 7  Apgar5: 9     BP Readings from Last 3 Encounters:   24 116/76   24 125/88   24 118/88    Wt Readings from  Last 3 Encounters:   24 50.8 kg (112 lb)   24 53.5 kg (118 lb)   24 54.5 kg (120 lb 1.6 oz)                  Patient Active Problem List   Diagnosis    Abnormal mammogram    CAMELIA I (cervical intraepithelial neoplasia I)    Crohn's disease of small intestine (H)    Dense breast tissue on mammogram    Double vision    Fibrocystic breast changes    Herpes zoster without complication    Papanicolaou smear of cervix with low grade squamous intraepithelial lesion (LGSIL)    Raynaud's disease without gangrene    Vitamin D deficiency    Abnormal CT of liver    Family history of malignant neoplasm of breast    Lung granuloma (H)    Paresthesias     Past Surgical History:   Procedure Laterality Date    COLONOSCOPY  Need one this summer    Crohn s disease    COLONOSCOPY N/A 2023    Procedure: COLONOSCOPY, WITH BIOPSY;  Surgeon: Gumaro Wood MD;  Location: [facility] OR    GI SURGERY  When I was 21    Iliectomy    ORTHOPEDIC SURGERY  When I was 18    Plate in right arm       Social History     Tobacco Use    Smoking status: Former     Current packs/day: 0.00     Average packs/day: 1 pack/day for 7.0 years (7.0 ttl pk-yrs)     Types: Cigarettes     Start date: 2003     Quit date: 2010     Years since quittin.9     Passive exposure: Never    Smokeless tobacco: Never    Tobacco comments:     Smoked when I was younger, no longer smoke   Substance Use Topics    Alcohol use: Yes     Comment: rare     Family History   Problem Relation Age of Onset    Anxiety Disorder Mother     Obesity Mother     Diabetes Father     Hypertension Father     Obesity Father     Breast Cancer Maternal Grandmother     Breast Cancer Paternal Grandmother     Anxiety Disorder Brother     Breast Cancer Cousin     Colorectal Cancer No family hx of          Current Outpatient Medications   Medication Sig Dispense Refill    VITAMIN D (CHOLECALCIFEROL) PO        No Known Allergies      Review of Systems  Constitutional,  "HEENT, cardiovascular, pulmonary, GI, , musculoskeletal, neuro, skin, endocrine and psych systems are negative, except as otherwise noted.     Objective    Exam  /76   Pulse 86   Temp 99  F (37.2  C) (Temporal)   Resp 16   Ht 1.537 m (5' 0.5\")   Wt 50.8 kg (112 lb)   LMP 11/18/2024 (Exact Date)   SpO2 99%   BMI 21.51 kg/m     Estimated body mass index is 21.51 kg/m  as calculated from the following:    Height as of this encounter: 1.537 m (5' 0.5\").    Weight as of this encounter: 50.8 kg (112 lb).    Physical Exam  GENERAL: alert and no distress  EYES: Eyes grossly normal to inspection, PERRL and conjunctivae and sclerae normal  HENT: ear canals and TM's normal, nose and mouth without ulcers or lesions  NECK: no adenopathy, no asymmetry, masses, or scars  RESP: lungs clear to auscultation - no rales, rhonchi or wheezes  BREAST: normal without masses, tenderness or nipple discharge and no palpable axillary masses or adenopathy with the exception of a linear, firm lump upper outer quadrant left breast  CV: regular rate and rhythm, normal S1 S2, no S3 or S4, no murmur, click or rub, no peripheral edema  ABDOMEN: soft, nontender, no hepatosplenomegaly, no masses and bowel sounds normal  MS: no gross musculoskeletal defects noted, no edema  SKIN: no suspicious lesions or rashes  NEURO: Normal strength and tone, mentation intact and speech normal  PSYCH: mentation appears normal, affect normal/bright        Signed Electronically by: Alea Jerry MD        Answers submitted by the patient for this visit:  Patient Health Questionnaire (Submitted on 12/4/2024)  If you checked off any problems, how difficult have these problems made it for you to do your work, take care of things at home, or get along with other people?: Not difficult at all  PHQ9 TOTAL SCORE: 3  Patient Health Questionnaire (G7) (Submitted on 12/4/2024)  NEIL 7 TOTAL SCORE: 6    "

## 2025-01-03 ENCOUNTER — HOSPITAL ENCOUNTER (OUTPATIENT)
Dept: ULTRASOUND IMAGING | Facility: CLINIC | Age: 40
Discharge: HOME | End: 2025-01-03
Attending: FAMILY MEDICINE | Admitting: FAMILY MEDICINE
Payer: COMMERCIAL

## 2025-01-03 DIAGNOSIS — N92.1 METRORRHAGIA: ICD-10-CM

## 2025-01-03 PROCEDURE — 76856 US EXAM PELVIC COMPLETE: CPT

## 2025-01-23 ENCOUNTER — OFFICE VISIT (OUTPATIENT)
Dept: INTERNAL MEDICINE | Facility: CLINIC | Age: 40
End: 2025-01-23
Payer: COMMERCIAL

## 2025-01-23 VITALS
SYSTOLIC BLOOD PRESSURE: 135 MMHG | TEMPERATURE: 97.8 F | OXYGEN SATURATION: 100 % | HEIGHT: 61 IN | BODY MASS INDEX: 21.09 KG/M2 | RESPIRATION RATE: 16 BRPM | DIASTOLIC BLOOD PRESSURE: 93 MMHG | HEART RATE: 80 BPM | WEIGHT: 111.7 LBS

## 2025-01-23 DIAGNOSIS — Z30.430 ENCOUNTER FOR IUD INSERTION: Primary | ICD-10-CM

## 2025-01-23 DIAGNOSIS — Z12.4 CERVICAL CANCER SCREENING: ICD-10-CM

## 2025-01-23 DIAGNOSIS — Z01.812 PRE-PROCEDURAL LABORATORY EXAMINATION: ICD-10-CM

## 2025-01-23 LAB — HCG UR QL: NEGATIVE

## 2025-01-23 ASSESSMENT — PAIN SCALES - GENERAL: PAINLEVEL_OUTOF10: NO PAIN (0)

## 2025-01-23 NOTE — PROGRESS NOTES
"IUD Insertion:  CONSULT:    Is a pregnancy test required: Yes.  Was it positive or negative?  Negative  Was a consent obtained?  Yes    Subjective: Macrina Gonzales is a 39 year old  presents for IUD and desires Mirena type IUD.    Patient has been given the opportunity to ask questions about all forms of birth control, including all options appropriate for Macrina Gonzales. Discussed that no method of birth control, except abstinence is 100% effective against pregnancy or sexually transmitted infection.     Macrina Gonzales understands she may have the IUD removed at any time. IUD should be removed by a health care provider.    The entire insertion procedure was reviewed with the patient, including care after placement.    She is due for cervical cancer screening in July. We can proceed with this today as part of her pelvic exam.     Patient's last menstrual period was 2025.  No allergy to betadine or shellfish.       HCG Qual Urine   Date Value Ref Range Status   2023 Negative Negative Final     hCG Urine Qualitative   Date Value Ref Range Status   2025 Negative Negative Final     Comment:     This test is for screening purposes.  Results should be interpreted along with the clinical picture.  Confirmation testing is available if warranted by ordering [id number], HCG Quantitative Pregnancy.         BP (!) 135/93 (BP Location: Left arm, Patient Position: Sitting, Cuff Size: Adult Regular)   Pulse 80   Temp 97.8  F (36.6  C) (Oral)   Resp 16   Ht 1.537 m (5' 0.5\")   Wt 50.7 kg (111 lb 11.2 oz)   LMP 2025   SpO2 100%   BMI 21.46 kg/m      Genital: EXTERNAL GENITALIA: Normal appearing vulva without masses, tenderness or lesions. PERINEUM: normal and intact. URETHRAL MEATUS: normal VAGINA:  vagina with normal color and without discharge or lesions. CERVIX: normal appearing cervix without discharge or lesions. Non-friable.      PROCEDURE NOTE: -- IUD Insertion  Reason for Insertion: " contraception and abnormal uterine bleeding    Under sterile technique, cervix was visualized with speculum and prepped with Betadine solution swab x 3. Tenaculum was placed for stability. The uterus was gently straightened and sounded to 7.5 cm. IUD prepared for placement, and IUD inserted according to 's instructions without difficulty or significant resitance, and deployed at the fundus. The strings were visualized and trimmed to 2.5 cm from the external os. Tenaculum was removed and hemostasis noted. Speculum removed.  Patient tolerated procedure well.    EBL: minimal    Complications: none    ASSESSMENT:     ICD-10-CM    1. Pre-procedural laboratory examination  Z01.812 HCG Qual, Urine ([id number])     HCG Qual, Urine ([id number])      2. Cervical cancer screening  Z12.4       3. Encounter for IUD insertion  Z30.430 levonorgestrel (MIRENA) 52 MG (20 mcg/day) IUD 1 each           PLAN:    Given 's handouts, including when to have IUD removed, list of danger s/sx, side effects and follow up recommended. Encouraged condom use for prevention of STD. Back up contraception advised for 7 days if progestin method. Advised to call for any fever, for prolonged or severe pain or bleeding, abnormal vaginal discharge, or unable to palpate strings. She was advised to use pain medications (ibuprofen) as needed for mild to moderate pain. Advised to follow-up in clinic in 4-6 weeks for IUD string check if unable to find strings or as directed by provider.     Natalie Krueger, DNP, APRN, CNP

## 2025-01-27 LAB
HPV HR 12 DNA CVX QL NAA+PROBE: NEGATIVE
HPV16 DNA CVX QL NAA+PROBE: NEGATIVE
HPV18 DNA CVX QL NAA+PROBE: NEGATIVE
HUMAN PAPILLOMA VIRUS FINAL DIAGNOSIS: NORMAL

## 2025-01-29 PROBLEM — N87.0 CIN I (CERVICAL INTRAEPITHELIAL NEOPLASIA I): Status: ACTIVE | Noted: 2018-01-16

## 2025-01-29 LAB
BKR AP ASSOCIATED HPV REPORT: NORMAL
BKR LAB AP GYN ADEQUACY: NORMAL
BKR LAB AP GYN INTERPRETATION: NORMAL
BKR LAB AP LMP: NORMAL
BKR LAB AP PREVIOUS ABNORMAL: NORMAL
PATH REPORT.COMMENTS IMP SPEC: NORMAL
PATH REPORT.COMMENTS IMP SPEC: NORMAL
PATH REPORT.RELEVANT HX SPEC: NORMAL

## 2025-03-06 ENCOUNTER — OFFICE VISIT (OUTPATIENT)
Dept: GASTROENTEROLOGY | Facility: CLINIC | Age: 40
End: 2025-03-06
Payer: COMMERCIAL

## 2025-03-06 ENCOUNTER — LAB (OUTPATIENT)
Dept: LAB | Facility: CLINIC | Age: 40
End: 2025-03-06
Payer: COMMERCIAL

## 2025-03-06 VITALS
HEART RATE: 73 BPM | DIASTOLIC BLOOD PRESSURE: 90 MMHG | OXYGEN SATURATION: 98 % | SYSTOLIC BLOOD PRESSURE: 131 MMHG | BODY MASS INDEX: 21.71 KG/M2 | HEIGHT: 61 IN | WEIGHT: 115 LBS

## 2025-03-06 DIAGNOSIS — K50.00 CROHN'S DISEASE OF SMALL INTESTINE WITHOUT COMPLICATION (H): ICD-10-CM

## 2025-03-06 DIAGNOSIS — K50.00 CROHN'S DISEASE OF SMALL INTESTINE WITHOUT COMPLICATION (H): Primary | ICD-10-CM

## 2025-03-06 ASSESSMENT — PAIN SCALES - GENERAL: PAINLEVEL_OUTOF10: NO PAIN (0)

## 2025-03-06 NOTE — PATIENT INSTRUCTIONS
It was a pleasure meeting with you today and discussing your healthcare plan. Below is a summary of what we covered:    Check labs today    Check stool study    Our office will you to schedule the colonoscopy    Follow up in 1 year      Please see below for any additional questions and scheduling guidelines.    Sign up for [employer]: [employer] patient portal serves as a secure platform for accessing your medical records from the [facility]. Additionally, [employer]Edge facilitates easy, timely, and secure messaging with your care team. If you have not signed up, you may do so by using the provided code or calling [phone].    Coordinating your care after your visit:  There are multiple options for scheduling your follow-up care based on your provider's recommendation.    How do I schedule a follow-up clinic appointment:   After your appointment, you may receive scheduling assistance with the Clinic Coordinators by having a seat in the waiting room and a Clinic Coordinator will call you up to schedule.  Virtual visits or after you leave the clinic:  Your provider has placed a follow-up order in the [employer] portal for scheduling your return appointment. A member of the scheduling team will contact you to schedule.  [facility] Scheduling: Timely scheduling through [employer] is advised to ensure appointment availability.   Call to schedule: You may schedule your follow-up appointment(s) by calling [phone], option 1.    How do I schedule my endoscopy or colonoscopy procedure:  If a procedure, such as a colonoscopy or upper endoscopy was ordered by your provider, the scheduling team will contact you to schedule this procedure. Or you may choose to call to schedule at   [phone], option 2.  Please allow 20-30 minutes when scheduling a procedure.    How do I get my blood work done? To get your blood work done, you need to schedule a lab appointment at an [facility]. There are multiple ways to  schedule:   At the clinic: The Clinic Coordinator you meet after your visit can help you schedule a lab appointment.   [facility] scheduling: [employer] offers online lab scheduling at all [facility] laboratory locations.   Call to schedule: You can call [phone] to schedule your lab appointment.    How do I schedule my imaging study: To schedule imaging studies, such as CT scans, ultrasounds, MRIs, or X-rays, contact Imaging Services at [phone].    How do I schedule a referral to another doctor: If your provider recommended a referral to another specialist(s), the referral order was placed by your provider. You will receive a phone call to schedule this referral, or you may choose to call the number attached to the referral to self-schedule.    For Post-Visit Question(s):  For any inquiries following today's visit:  Please utilize [employer] messaging and allow 48 hours for reply or contact the Call Center during normal business hours at [phone], option 3.  For Emergent After-hours questions, contact the On-Call GI Fellow through the [facility]  at [phone].  In addition, you may contact your Nurse directly using the provided contact information.    Test Results:  Test results will be accessible via [employer] in compliance with the 21st Century Cures Act. This means that your results will be available to you at the same time as your provider. Often you may see your results before your provider does. Results are reviewed by staff within two weeks with communication follow-up. Results may be released in the patient portal prior to your care team review.    Prescription Refill(s):  Medication prescribed by your provider will be addressed during your visit. For future refills, please coordinate with your pharmacy. If you have not had a recent clinic visit or routine labs, for your safety, your provider may not be able to refill your prescription.

## 2025-03-06 NOTE — PROGRESS NOTES
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 Doing exceptionally well  -check labs  -check fecal calprotectin  -check colonoscopy to ensure no significant ileal recurrence. If no significant inflammation 4 years off therapy we can likely space out imaging/endoscopic follow up  -continue fiber in diet    2. Prior B12 deficiency. Check b12    IBD HISTORY  Age at diagnosis: 2002  Extent of disease: ileal  Disease phenotype: stricturing  Amanda-anal disease: none  Current CD medications: none  Prior IBD surgeries: ileal resection with IC anastomosis  Prior IBD Medications:  -prednisone - remote courses  -6-MP did not control disease  -infliximab - did well - switched for insurance issues - switched to adalimumab (also easier to use - no infusions)  -adalimumab - started 2016 and continued until CVA 11/2021 (vertebral artery dissection)    DRUG MONITORING  TPMT enzyme activity: 32 (normal)     6-TGN/6-MMPN levels: NA     Biologic concentration: unknown    DISEASE ASSESSMENT  Labs  Recent Labs   Lab Test 03/07/24  1031 12/04/23  0937 03/08/23  0842 09/22/22  1253   CRP  --   --   --  <0.1   SED 10  --  12  --    HGB 13.7   < > 13.8  --     < > = values in this interval not displayed.     Fecal calprotectin: 10.9 9/2022  Endoscopy: Colonoscopy 3/2023 - No significant inflammation - 2-3 small aphthous ulcers in yg-terminal ileum/anastomosis - rutgeert's i1  Enterography:   -MRE normal with no active inflammation spring 2023  -MRE early 2024 with no active inflammation  C diff: none    sIBDQ:   IBDQ Score Date IBDQ - Total Score  (Higher score better)   3/6/2025   5:21 AM 68   9/21/2023   7:30 AM 66   9/15/2023   1:47 PM 68   3/1/2023  10:24 AM 65   9/20/2022   9:04 PM 60       IBD Health Care Maintenance:    Vaccinations:  All patients on biologics should avoid live  vaccines.    -- Influenza (every year)  -- TdaP (every 10 years)  -- Pneumococcal Pneumonia (once plus booster at 5 years)  -- Yearly assessment for latent Tb (verbal screening and exam, PPD or QuantiFERON-Tb testing)     One time confirmation of immunity or serologies:  -- Hepatitis A (serologies or immunizations)  -- Hepatitis B (serologies or immunizations)  -- Varicella  -- MMR  -- HPV (all aged 18-26)  -- Meningococcal meningitis (all patients at risk for meningitis)  -- ok for live vaccines       Bone mineral density screening   -- Recommend all patients supplement with calcium and vitamin D  -- Given prior steroid use recommend DEXA if not already done - ORDERED 3/7/24     Cancer Screening:  Colon cancer screening:  Given ileal disease, DO NOT recommend patient undergo regular dysplasia surveillance   Next dysplasia screening is recommended NOT NEEDED.     Cervical cancer screening: Per OBGYN     Skin cancer screening: Not needed as not immunosuppressed     Depression Screening:  -- Over the last month, have you felt down, depressed, or hopeless? NO  -- Over the last month, have you felt little interest or pleasure doing things? NO     Misc:  -- Avoid tobacco use  -- Avoid NSAIDs as there is potentially a 25% chance of causing an IBD flare    Return to clinic in 12 months    Thank you for this consultation.  It was a pleasure to participate in the care of this patient; please contact us with any further questions.  I spent a total of 30 minutes during the day of encounter performed chart review, meeting with patient, patient counseling, care coordination, and documentation.      This note was created with voice recognition software, and while reviewed for accuracy, typos may remain.     Gumaro Wood MD  Division of Gastroenterology, Hepatology and Nutrition  [facility]  Pager: [phone]      HPI:   Currently, here for her routine yearly follow up.    Continues to do great! No issues at all.    BMs  regular - especially if she gets her fiber in her diet (greek yogurt and granola in AM and large salad for lunch; dinner varies). Can get a little constipated if fiber drops.    No rashes, joint pains, mouth sores, eye changes or other EIM.    Son is now 4 years old - they took him snowboarding in [place] for the first time.    ROS:    No fevers or chills  No weight loss  No blurry vision, double vision or change in vision  No sore throat  No lymphadenopathy  No headache, paraesthesias, or weakness in a limb  No shortness of breath or wheezing  No chest pain or pressure  No arthralgias or myalgias  No rashes or skin changes  No odynophagia or dysphagia  No BRBPR, hematochezia, melena  No dysuria, frequency or urgency  No hot/cold intolerance or polyria  No anxiety or depression    Extra intestinal manifestations of IBD:  No uveitis/episcleritis  No aphthous ulcers   No arthritis   No erythema nodosum/pyoderma gangrenosum.     PERTINENT PAST MEDICAL HISTORY:  Past Medical History:   Diagnosis Date    Cerebral infarction (H) December 23, 2021    Right vertebral artery dissection    Cerebrovascular accident (CVA) due to occlusion of right vertebral artery (H) 12/24/2021    Formatting of this note is different from the original.  12/28/2021 discharge -clopidogrel 75mg daily. ASA switched to 81mg QD on discharge. Per neuro will need to continue DAPT for 3-6 months. Will need stroke clinic follow-up in ~6 weeks and repeat imaging in three months.     Neurology 1/22 -Recommendations:  1) Further neurodiagnostic work up:   - Repeat CTA head and neck in April 2022.   - Ob    Vertebral artery dissection 07/11/2022       PREVIOUS SURGERIES:  Past Surgical History:   Procedure Laterality Date    COLONOSCOPY  Need one this summer    Crohn s disease    COLONOSCOPY N/A 2/28/2023    Procedure: COLONOSCOPY, WITH BIOPSY;  Surgeon: Gumaro Wood MD;  Location: [facility] OR    GI SURGERY  When I was 21    Iliectomy    ORTHOPEDIC  SURGERY  When I was 18    Plate in right arm       PREVIOUS ENDOSCOPY:  Results for orders placed or performed during the hospital encounter of 02/28/23   COLONOSCOPY    Collection Time: 02/28/23  7:08 AM   Result Value Ref Range    COLONOSCOPY       [facility]  [address] [place], [place] [address]., [place] [address] [phone]     Endoscopy Department  _______________________________________________________________________________  Patient Name: Macrina Gonzales          Procedure Date: 2/28/2023 7:08 AM  MRN: [id number]                       Account Number: [id number]  YOB: 1985              Admit Type: Outpatient  Age: 38                               Room: [facility] PROCEDURE ROOM 02  Gender: Female                        Note Status: Finalized  Attending MD: EMERALD MAGDALENO MD  Total Sedation Time:   _______________________________________________________________________________     Procedure:             Colonoscopy  Indications:           Disease activity assessment of Crohn's disease of the                          small bowel  Providers:             EMERALD MAGDALENO MD, Carol Zaman, CRNA  Referring MD:          Alea Jerry MD  Requesting Provider:   MIKY Jerry MD  Medicines:             Monitored Anesthesia Care  Complications:         No immediate complications.  _______________________________________________________________________________  Procedure:             Pre-Anesthesia Assessment:                         - See EPIC H and P note                         After obtaining informed consent, the colonoscope was                          passed under direct vision. Throughout the procedure,                          the patient's blood pressure, pulse, and oxygen                          saturations were monitored continuously. The                          Colonoscope was introduced through the anus and                           advanced to the ileocolonic anastomosis. The                          colonoscopy was performed without difficulty. The                          patient tolerated the procedure well. The quality of                          the bowel preparation was evaluated using the BBPS                          ( [place] Bowel Preparation Scale) with scores of: Right                          Colon = 3, Transverse Colon = 3 and Left Colon = 3                          (entire mucosa seen well with no residual staining,                          small fragments of stool or opaque liquid). The total                          BBPS score equals 9.                                                                                   Findings:       Skin tags were found on perianal exam.       The Simple Endoscopic Score for Crohn's Disease was determined based on        the endoscopic appearance of the mucosa in the following segments:       - Ileum: Findings include aphthous ulcers less than 0.5 cm in size, less        than 10% ulcerated surfaces, no affected surfaces and no narrowings.        Segment score: 2.       - Right Colon: Findings include no ulcers present, no ulcerated        surfaces, no affected surfaces and no narrowings. Segment score: 0.       - Transverse Colon: Findings include no ulcers p resent, no ulcerated        surfaces, no affected surfaces and no narrowings. Segment score: 0.       - Left Colon: Findings include no ulcers present, no ulcerated surfaces,        no affected surfaces and no narrowings. Segment score: 0.       - Rectum: Findings include no ulcers present, no ulcerated surfaces, no        affected surfaces and no narrowings. Segment score: 0.       - Total SES-CD aggregate score: 2. Biopsies were taken with a cold        forceps for histology of the ileum, right colon, left colon and rectum        (separate jars).       There was evidence of a prior end-to-end ileo-colonic anastomosis in the         ascending colon. This was patent and was characterized by healthy        appearing mucosa. The anastomosis was traversed.       A few small-mouthed diverticula were found in the sigmoid colon,        descending colon and transverse colon.       The exam was otherwise without abnormality on direct and retroflexion        views.                                                                                    Impression:            There were only 3 very small aphthous ulcers (1-3mm in                          diameter) at the ileo-colonic anastomosis and in the                          distal 5 cm of the yg-terminal ileum. This is                          consistent with Rutgeert's i1. She has been on                          medication for over a year and almost 2 years. Will                          follow up in clinic. Likely check MRE and if                          unremarkable will continue to monitor off therapy                          given how well she has done.                         - Perianal skin tags found on perianal exam.                         - Simple Endoscopic Score for Crohn's Disease: 2,                          mucosal inflammatory changes secondary to Crohn's                          disease, in remission. Biopsied.                         - Patent end-to-end ileo-colonic anastomosis,                          c haracterized by healthy appearing mucosa.                         - Diverticulosis in the sigmoid colon, in the                          descending colon and in the transverse colon.                         - The examination was otherwise normal on direct and                          retroflexion views.  Recommendation:        - Discharge patient to home (with escort).                         - Resume previous diet.                         - Continue present medications.                         - Await pathology results.                         - Repeat colonoscopy in 2 years for  surveillance.                         - Return to GI clinic as previously scheduled.                                                                                     Electronically Signed by: Dr. Gumaro Magdaleno  ___________________________  GUMARO MAGDALENO MD  2/28/2023 9:59:45 AM  I was physically present for the entire viewing portion of the exam.  __________________________  Signature of [facility] physician  GUMARO MAGDALENO MD  Number of Addenda: 0    Note Initiated On: 2/28/2023 7:08 AM  Scope In:  Scope Out:     ]    ALLERGIES:   No Known Allergies    PERTINENT MEDICATIONS:    Current Outpatient Medications:     levonorgestrel (MIRENA) 52 MG (20 mcg/day) IUD, by Intrauterine route once. Placed 1/23/25. Remove: 1/23/33, Disp: , Rfl:     VITAMIN D (CHOLECALCIFEROL) PO, , Disp: , Rfl:     SOCIAL HISTORY:  Social History     Socioeconomic History    Marital status:      Spouse name: Not on file    Number of children: Not on file    Years of education: Not on file    Highest education level: Not on file   Occupational History    Not on file   Tobacco Use    Smoking status: Former     Current packs/day: 0.00     Average packs/day: 1 pack/day for 7.0 years (7.0 ttl pk-yrs)     Types: Cigarettes     Start date: 1/1/2003     Quit date: 1/1/2010     Years since quitting: 15.1     Passive exposure: Never    Smokeless tobacco: Never    Tobacco comments:     Smoked when I was younger, no longer smoke   Vaping Use    Vaping status: Never Used   Substance and Sexual Activity    Alcohol use: Yes     Comment: rare    Drug use: Yes     Types: Marijuana     Comment: 2    Sexual activity: Yes     Partners: Male     Birth control/protection: Pull-out method, Natural Family Planning     Comment:  Peter   Other Topics Concern    Parent/sibling w/ CABG, MI or angioplasty before 65F 55M? No   Social History Narrative    Not on file     Social Drivers of Health     Financial Resource Strain: Low Risk   (12/4/2024)    Financial Resource Strain     Within the past 12 months, have you or your family members you live with been unable to get utilities (heat, electricity) when it was really needed?: No   Food Insecurity: Low Risk  (12/4/2024)    Food Insecurity     Within the past 12 months, did you worry that your food would run out before you got money to buy more?: No     Within the past 12 months, did the food you bought just not last and you didn t have money to get more?: No   Transportation Needs: Low Risk  (12/4/2024)    Transportation Needs     Within the past 12 months, has lack of transportation kept you from medical appointments, getting your medicines, non-medical meetings or appointments, work, or from getting things that you need?: No   Physical Activity: Unknown (12/4/2024)    Exercise Vital Sign     Days of Exercise per Week: 6 days     Minutes of Exercise per Session: Not on file   Stress: Stress Concern Present (12/4/2024)    [employer] - Occupational Stress Questionnaire     Feeling of Stress : To some extent   Social Connections: Unknown (12/4/2024)    Social Connection and Isolation Panel [NHANES]     Frequency of Communication with Friends and Family: Not on file     Frequency of Social Gatherings with Friends and Family: Twice a week     Attends Muslim Services: Not on file     Active Member of Clubs or Organizations: Not on file     Attends Club or Organization Meetings: Not on file     Marital Status: Not on file   Interpersonal Safety: Low Risk  (12/4/2024)    Interpersonal Safety     Do you feel physically and emotionally safe where you currently live?: Yes     Within the past 12 months, have you been hit, slapped, kicked or otherwise physically hurt by someone?: No     Within the past 12 months, have you been humiliated or emotionally abused in other ways by your partner or ex-partner?: No   Housing Stability: Low Risk  (12/4/2024)    Housing Stability     Do  you have housing? : Yes     Are you worried about losing your housing?: No       FAMILY HISTORY:  Family History   Problem Relation Age of Onset    Anxiety Disorder Mother     Obesity Mother     Diabetes Father     Hypertension Father     Obesity Father     Breast Cancer Maternal Grandmother     Breast Cancer Paternal Grandmother     Anxiety Disorder Brother     Breast Cancer Cousin     Colorectal Cancer No family hx of        Past/family/social history reviewed and no changes    PHYSICAL EXAMINATION:  Constitutional: aaox3, cooperative, pleasant, not dyspneic/diaphoretic, no acute distress  Vitals reviewed: LMP 01/06/2025   Wt:   Wt Readings from Last 2 Encounters:   01/23/25 50.7 kg (111 lb 11.2 oz)   12/04/24 50.8 kg (112 lb)      Eyes: Sclera anicteric/injected  Ears/nose/mouth/throat: Normal oropharynx without ulcers or exudate, mucus membranes moist, hearing intact  Neck: supple, thyroid normal size  CV: No edema  Respiratory: Unlabored breathing  Lymph: No axillary, submandibular, supraclavicular or inguinal lymphadenopathy  Abd:  Nondistended, +bs, no hepatosplenomegaly, nontender, no peritoneal signs  Skin: warm, perfused, no jaundice  Psych: Normal affect  MSK: Normal gait      PERTINENT STUDIES:  Most recent CBC:  Recent Labs   Lab Test 03/07/24  1031 12/04/23  0937   WBC 6.7 4.1   HGB 13.7 13.1   HCT 41.3 39.9    261     Most recent hepatic panel:  Recent Labs   Lab Test 03/08/23  0842 09/22/22  1253   ALT 13 14   AST 16 18     Most recent creatinine:  Recent Labs   Lab Test 03/07/24  1031 12/04/23  0937   CR 0.75 0.74

## 2025-03-06 NOTE — NURSING NOTE
"Do you have a history of colon cancer in your immediate family? NO    If yes who: negative     And what age  were they diagnosed: n/a      Chief Complaint   Patient presents with    Follow Up       Vitals:    03/06/25 1032   BP: 131/90   Pulse: 73   SpO2: 98%   Weight: 52.2 kg (115 lb)   Height: 1.549 m (5' 1\")       Body mass index is 21.73 kg/m .    Brandy Ferguson MA      "

## 2025-03-15 ENCOUNTER — OFFICE VISIT (OUTPATIENT)
Dept: URGENT CARE | Facility: URGENT CARE | Age: 40
End: 2025-03-15
Payer: COMMERCIAL

## 2025-03-15 VITALS
DIASTOLIC BLOOD PRESSURE: 81 MMHG | OXYGEN SATURATION: 98 % | TEMPERATURE: 97.9 F | SYSTOLIC BLOOD PRESSURE: 122 MMHG | BODY MASS INDEX: 21.73 KG/M2 | HEART RATE: 70 BPM | WEIGHT: 115 LBS

## 2025-03-15 DIAGNOSIS — J20.9 ACUTE BRONCHITIS WITH SYMPTOMS > 10 DAYS: Primary | ICD-10-CM

## 2025-03-15 PROCEDURE — 3079F DIAST BP 80-89 MM HG: CPT | Performed by: FAMILY MEDICINE

## 2025-03-15 PROCEDURE — 3074F SYST BP LT 130 MM HG: CPT | Performed by: FAMILY MEDICINE

## 2025-03-15 PROCEDURE — 99213 OFFICE O/P EST LOW 20 MIN: CPT | Performed by: FAMILY MEDICINE

## 2025-03-15 RX ORDER — PREDNISONE 10 MG/1
20 TABLET ORAL DAILY
Qty: 10 TABLET | Refills: 0 | Status: SHIPPED | OUTPATIENT
Start: 2025-03-15 | End: 2025-03-20

## 2025-03-15 RX ORDER — GUAIFENESIN AND DEXTROMETHORPHAN HYDROBROMIDE 600; 30 MG/1; MG/1
1 TABLET, EXTENDED RELEASE ORAL EVERY 12 HOURS
COMMUNITY

## 2025-03-15 RX ORDER — AZITHROMYCIN 250 MG/1
TABLET, FILM COATED ORAL
Qty: 6 TABLET | Refills: 0 | Status: SHIPPED | OUTPATIENT
Start: 2025-03-15 | End: 2025-03-20

## 2025-03-15 NOTE — PATIENT INSTRUCTIONS
Stay well hydrated    Mucinex as needed     Take the prednisone    Hold prescription for antibiotic, fill if needed based on symptoms     Follow up as needed

## 2025-03-15 NOTE — PROGRESS NOTES
Macrina Gonzales is a 40 year old female who comes in today for 1 1/2 week of symptoms     Bad cough     Mucus drainage    Had chills   Some nightsweats    No fever today    Patient otherwise healthy      ROS    Physical Exam  Constitutional:       Appearance: Normal appearance.   HENT:      Head: Normocephalic and atraumatic.      Right Ear: Tympanic membrane and ear canal normal.      Left Ear: Tympanic membrane, ear canal and external ear normal.      Nose: Nose normal.      Mouth/Throat:      Pharynx: Oropharynx is clear.   Eyes:      Conjunctiva/sclera: Conjunctivae normal.   Cardiovascular:      Rate and Rhythm: Normal rate and regular rhythm.      Heart sounds: Normal heart sounds.   Pulmonary:      Effort: No respiratory distress.      Comments: Some scattered wheezes present  Chest:      Chest wall: No tenderness.   Abdominal:      Palpations: Abdomen is soft.      Tenderness: There is no abdominal tenderness.   Neurological:      Mental Status: She is alert.     No edema     Radials symmetric    ASSESSMENT / PLAN:  (J20.9) Acute bronchitis with symptoms > 10 days  (primary encounter diagnosis)  Comment: discussed in detail with patient.  With the wheezing, reasonable to treatment with low dose prednisone for a few days.  She has had this before.  Also printed prescription for antibiotic that she can just hold and fill if needed.  Follow up prn.   Plan: predniSONE (DELTASONE) 10 MG tablet,         azithromycin (ZITHROMAX) 250 MG tablet               I reviewed the patient's medications, allergies, medical history, family history, and social history.    Kenrick Lara MD

## 2025-07-13 ENCOUNTER — VIRTUAL VISIT (OUTPATIENT)
Dept: URGENT CARE | Facility: CLINIC | Age: 40
End: 2025-07-13
Payer: COMMERCIAL

## 2025-07-13 DIAGNOSIS — R82.90 CLOUDY URINE: Primary | ICD-10-CM

## 2025-07-13 PROCEDURE — 98005 SYNCH AUDIO-VIDEO EST LOW 20: CPT

## 2025-07-13 NOTE — PROGRESS NOTES
Macrina is a 40 year old who is being evaluated via a billable video visit.    How would you like to obtain your AVS? MyChart  If the video visit is dropped, the invitation should be resent by: Text to cell phone: [phone]  Will anyone else be joining your video visit? No      Assessment & Plan   Problem List Items Addressed This Visit    None  Visit Diagnoses         Cloudy urine    -  Primary    Relevant Orders    UA Macroscopic with reflex to Microscopic and Culture - Lab Collect    Wet prep - lab collect           Lower pelvic discomfort and cloudy urine with recent travel.  No additional symptoms.  Will obtain UA and wet prep and treat accordingly.        Subjective   Macrina is a 40 year old, presenting for the following health issues:  No chief complaint on file.    HPI          Review of Systems  Constitutional, HEENT, cardiovascular, pulmonary, GI, , musculoskeletal, neuro, skin, endocrine and psych systems are negative, except as otherwise noted.      Objective           Vitals:  No vitals were obtained today due to virtual visit.    Physical Exam   GENERAL: alert and no distress  EYES: Eyes grossly normal to inspection.  No discharge or erythema, or obvious scleral/conjunctival abnormalities.  RESP: No audible wheeze, cough, or visible cyanosis.    SKIN: Visible skin clear. No significant rash, abnormal pigmentation or lesions.  NEURO: Cranial nerves grossly intact.  Mentation and speech appropriate for age.  PSYCH: Appropriate affect, tone, and pace of words          Video-Visit Details    Type of service:  Video Visit   Originating Location (pt. Location): Home    Distant Location (provider location):  Off-site  Platform used for Video Visit: Sushila  Signed Electronically by: [facility]

## (undated) DEVICE — SNARE CAPIVATOR ROUND COLD SNR BX10 M00561101

## (undated) DEVICE — GOWN IMPERVIOUS 2XL BLUE

## (undated) DEVICE — SOL WATER IRRIG 500ML BOTTLE 2F7113

## (undated) DEVICE — KIT ENDO TURNOVER/PROCEDURE CARRY-ON 101822

## (undated) DEVICE — SUCTION MANIFOLD NEPTUNE 2 SYS 1 PORT 702-025-000

## (undated) DEVICE — SPECIMEN CONTAINER 3OZ W/FORMALIN 59901

## (undated) DEVICE — TUBING SUCTION 12"X1/4" N612

## (undated) DEVICE — ENDO FORCEP SPIKED SERRATED SHAFT JUMBO 239CM G56998

## (undated) DEVICE — KIT ENDO FIRST STEP DISINFECTANT 200ML W/POUCH EP-4